# Patient Record
Sex: MALE | Race: WHITE | NOT HISPANIC OR LATINO | Employment: OTHER | URBAN - METROPOLITAN AREA
[De-identification: names, ages, dates, MRNs, and addresses within clinical notes are randomized per-mention and may not be internally consistent; named-entity substitution may affect disease eponyms.]

---

## 2023-02-14 PROBLEM — K86.9 PANCREATIC LESION (HHS-HCC): Status: ACTIVE | Noted: 2023-02-14

## 2023-02-14 PROBLEM — E78.1 HYPERTRIGLYCERIDEMIA: Status: ACTIVE | Noted: 2023-02-14

## 2023-02-14 PROBLEM — L84 FOOT CALLUS: Status: ACTIVE | Noted: 2023-02-14

## 2023-02-14 PROBLEM — N28.9 KIDNEY LESION, NATIVE, RIGHT: Status: ACTIVE | Noted: 2023-02-14

## 2023-02-14 PROBLEM — R31.9 BLOOD IN URINE: Status: ACTIVE | Noted: 2023-02-14

## 2023-02-14 PROBLEM — G47.33 OSA (OBSTRUCTIVE SLEEP APNEA): Status: ACTIVE | Noted: 2023-02-14

## 2023-02-14 PROBLEM — N52.9 MALE ERECTILE DISORDER: Status: ACTIVE | Noted: 2023-02-14

## 2023-02-14 PROBLEM — N40.0 BENIGN ENLARGEMENT OF PROSTATE: Status: ACTIVE | Noted: 2023-02-14

## 2023-02-14 PROBLEM — N28.89 RIGHT KIDNEY MASS: Status: ACTIVE | Noted: 2023-02-14

## 2023-02-14 PROBLEM — R42 VERTIGO: Status: ACTIVE | Noted: 2023-02-14

## 2023-02-14 PROBLEM — I10 HYPERTENSION: Status: ACTIVE | Noted: 2023-02-14

## 2023-02-14 PROBLEM — M25.469 KNEE SWELLING: Status: ACTIVE | Noted: 2023-02-14

## 2023-02-14 PROBLEM — R25.1 TREMOR: Status: ACTIVE | Noted: 2023-02-14
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
## 2023-02-14 PROBLEM — H81.10 BPPV (BENIGN PAROXYSMAL POSITIONAL VERTIGO): Status: ACTIVE | Noted: 2023-02-14

## 2023-02-14 PROBLEM — E78.5 HYPERLIPIDEMIA: Status: ACTIVE | Noted: 2023-02-14

## 2023-02-14 PROBLEM — R79.89 ELEVATED TSH: Status: ACTIVE | Noted: 2023-02-14

## 2023-02-14 PROBLEM — L03.115 CELLULITIS OF RIGHT LOWER EXTREMITY: Status: ACTIVE | Noted: 2023-02-14

## 2023-02-14 PROBLEM — G25.0 BENIGN ESSENTIAL TREMOR: Status: ACTIVE | Noted: 2023-02-14

## 2023-02-14 PROBLEM — E11.9 DIABETES MELLITUS (MULTI): Status: ACTIVE | Noted: 2023-02-14

## 2023-02-14 PROBLEM — R26.89 BALANCE PROBLEMS: Status: ACTIVE | Noted: 2023-02-14

## 2023-02-14 PROBLEM — S89.90XA INJURY OF LEG: Status: ACTIVE | Noted: 2023-02-14

## 2023-02-14 PROBLEM — R80.9 PROTEIN IN URINE: Status: ACTIVE | Noted: 2023-02-14

## 2023-02-14 PROBLEM — M79.89 RIGHT LEG SWELLING: Status: ACTIVE | Noted: 2023-02-14

## 2023-02-14 PROBLEM — R26.81 GAIT INSTABILITY: Status: ACTIVE | Noted: 2023-02-14

## 2023-02-14 PROBLEM — M19.90 ARTHRITIS: Status: ACTIVE | Noted: 2023-02-14

## 2023-02-14 PROBLEM — S39.012A LUMBAR STRAIN: Status: ACTIVE | Noted: 2023-02-14

## 2023-02-14 PROBLEM — R27.0 ATAXIA: Status: ACTIVE | Noted: 2023-02-14

## 2023-02-14 RX ORDER — METFORMIN HYDROCHLORIDE 500 MG/1
2 TABLET ORAL 2 TIMES DAILY
COMMUNITY
Start: 2014-01-23 | End: 2024-05-13

## 2023-02-14 RX ORDER — LOSARTAN POTASSIUM 100 MG/1
1 TABLET ORAL DAILY
COMMUNITY
Start: 2019-10-03 | End: 2024-03-04

## 2023-02-14 RX ORDER — HYDROCHLOROTHIAZIDE 25 MG/1
1 TABLET ORAL DAILY
COMMUNITY
Start: 2019-10-03 | End: 2024-02-16

## 2023-02-14 RX ORDER — BLOOD SUGAR DIAGNOSTIC
STRIP MISCELLANEOUS
COMMUNITY
Start: 2020-03-31 | End: 2023-05-01

## 2023-02-14 RX ORDER — SIMVASTATIN 20 MG/1
1 TABLET, FILM COATED ORAL DAILY
COMMUNITY
Start: 2014-01-23 | End: 2023-10-11 | Stop reason: SDUPTHER

## 2023-02-14 RX ORDER — ASPIRIN 81 MG/1
1 TABLET ORAL DAILY
COMMUNITY
Start: 2014-03-11

## 2023-02-14 RX ORDER — MECLIZINE HYDROCHLORIDE 25 MG/1
1 TABLET ORAL 3 TIMES DAILY PRN
COMMUNITY
Start: 2022-09-14

## 2023-02-14 RX ORDER — PRIMIDONE 50 MG/1
50 TABLET ORAL 3 TIMES DAILY
COMMUNITY
Start: 2021-07-19

## 2023-02-21 LAB
ALANINE AMINOTRANSFERASE (SGPT) (U/L) IN SER/PLAS: 17 U/L (ref 10–52)
ALBUMIN (G/DL) IN SER/PLAS: 4.3 G/DL (ref 3.4–5)
ALKALINE PHOSPHATASE (U/L) IN SER/PLAS: 48 U/L (ref 33–136)
ANION GAP IN SER/PLAS: 11 MMOL/L (ref 10–20)
ASPARTATE AMINOTRANSFERASE (SGOT) (U/L) IN SER/PLAS: 18 U/L (ref 9–39)
BASOPHILS (10*3/UL) IN BLOOD BY AUTOMATED COUNT: 0.04 X10E9/L (ref 0–0.1)
BASOPHILS/100 LEUKOCYTES IN BLOOD BY AUTOMATED COUNT: 0.6 % (ref 0–2)
BILIRUBIN TOTAL (MG/DL) IN SER/PLAS: 0.4 MG/DL (ref 0–1.2)
CALCIUM (MG/DL) IN SER/PLAS: 9.7 MG/DL (ref 8.6–10.6)
CARBON DIOXIDE, TOTAL (MMOL/L) IN SER/PLAS: 32 MMOL/L (ref 21–32)
CHLORIDE (MMOL/L) IN SER/PLAS: 101 MMOL/L (ref 98–107)
CHOLESTEROL (MG/DL) IN SER/PLAS: 150 MG/DL (ref 0–199)
CHOLESTEROL IN HDL (MG/DL) IN SER/PLAS: 42.3 MG/DL
CHOLESTEROL/HDL RATIO: 3.5
CREATININE (MG/DL) IN SER/PLAS: 1.18 MG/DL (ref 0.5–1.3)
EOSINOPHILS (10*3/UL) IN BLOOD BY AUTOMATED COUNT: 0.29 X10E9/L (ref 0–0.4)
EOSINOPHILS/100 LEUKOCYTES IN BLOOD BY AUTOMATED COUNT: 4.1 % (ref 0–6)
ERYTHROCYTE DISTRIBUTION WIDTH (RATIO) BY AUTOMATED COUNT: 14.3 % (ref 11.5–14.5)
ERYTHROCYTE MEAN CORPUSCULAR HEMOGLOBIN CONCENTRATION (G/DL) BY AUTOMATED: 32.5 G/DL (ref 32–36)
ERYTHROCYTE MEAN CORPUSCULAR VOLUME (FL) BY AUTOMATED COUNT: 94 FL (ref 80–100)
ERYTHROCYTES (10*6/UL) IN BLOOD BY AUTOMATED COUNT: 4.93 X10E12/L (ref 4.5–5.9)
ESTIMATED AVERAGE GLUCOSE FOR HBA1C: 148 MG/DL
GFR MALE: 63 ML/MIN/1.73M2
GLUCOSE (MG/DL) IN SER/PLAS: 133 MG/DL (ref 74–99)
HEMATOCRIT (%) IN BLOOD BY AUTOMATED COUNT: 46.4 % (ref 41–52)
HEMOGLOBIN (G/DL) IN BLOOD: 15.1 G/DL (ref 13.5–17.5)
HEMOGLOBIN A1C/HEMOGLOBIN TOTAL IN BLOOD: 6.8 %
IMMATURE GRANULOCYTES/100 LEUKOCYTES IN BLOOD BY AUTOMATED COUNT: 0.1 % (ref 0–0.9)
LDL: 76 MG/DL (ref 0–99)
LEUKOCYTES (10*3/UL) IN BLOOD BY AUTOMATED COUNT: 7.2 X10E9/L (ref 4.4–11.3)
LYMPHOCYTES (10*3/UL) IN BLOOD BY AUTOMATED COUNT: 3.26 X10E9/L (ref 0.8–3)
LYMPHOCYTES/100 LEUKOCYTES IN BLOOD BY AUTOMATED COUNT: 45.6 % (ref 13–44)
MONOCYTES (10*3/UL) IN BLOOD BY AUTOMATED COUNT: 0.53 X10E9/L (ref 0.05–0.8)
MONOCYTES/100 LEUKOCYTES IN BLOOD BY AUTOMATED COUNT: 7.4 % (ref 2–10)
NEUTROPHILS (10*3/UL) IN BLOOD BY AUTOMATED COUNT: 3.02 X10E9/L (ref 1.6–5.5)
NEUTROPHILS/100 LEUKOCYTES IN BLOOD BY AUTOMATED COUNT: 42.2 % (ref 40–80)
NRBC (PER 100 WBCS) BY AUTOMATED COUNT: 0 /100 WBC (ref 0–0)
PLATELETS (10*3/UL) IN BLOOD AUTOMATED COUNT: 220 X10E9/L (ref 150–450)
POTASSIUM (MMOL/L) IN SER/PLAS: 4.3 MMOL/L (ref 3.5–5.3)
PROSTATE SPECIFIC ANTIGEN,SCREEN: 2.14 NG/ML (ref 0–4)
PROTEIN TOTAL: 6.6 G/DL (ref 6.4–8.2)
SODIUM (MMOL/L) IN SER/PLAS: 140 MMOL/L (ref 136–145)
THYROTROPIN (MIU/L) IN SER/PLAS BY DETECTION LIMIT <= 0.05 MIU/L: 4.25 MIU/L (ref 0.44–3.98)
THYROXINE (T4) FREE (NG/DL) IN SER/PLAS: 0.86 NG/DL (ref 0.78–1.48)
TRIGLYCERIDE (MG/DL) IN SER/PLAS: 159 MG/DL (ref 0–149)
UREA NITROGEN (MG/DL) IN SER/PLAS: 17 MG/DL (ref 6–23)
VLDL: 32 MG/DL (ref 0–40)

## 2023-03-31 ENCOUNTER — OFFICE VISIT (OUTPATIENT)
Dept: PRIMARY CARE | Facility: CLINIC | Age: 78
End: 2023-03-31
Payer: MEDICARE

## 2023-03-31 VITALS
HEART RATE: 69 BPM | BODY MASS INDEX: 35.42 KG/M2 | DIASTOLIC BLOOD PRESSURE: 78 MMHG | SYSTOLIC BLOOD PRESSURE: 120 MMHG | TEMPERATURE: 97.2 F | HEIGHT: 71 IN | WEIGHT: 253 LBS

## 2023-03-31 DIAGNOSIS — Z01.818 PREOPERATIVE CLEARANCE: Primary | ICD-10-CM

## 2023-03-31 DIAGNOSIS — R06.02 SHORTNESS OF BREATH: ICD-10-CM

## 2023-03-31 DIAGNOSIS — M17.12 OSTEOARTHRITIS OF LEFT KNEE, UNSPECIFIED OSTEOARTHRITIS TYPE: ICD-10-CM

## 2023-03-31 PROBLEM — M25.469 KNEE SWELLING: Status: RESOLVED | Noted: 2023-02-14 | Resolved: 2023-03-31

## 2023-03-31 PROBLEM — R25.1 TREMOR: Status: RESOLVED | Noted: 2023-02-14 | Resolved: 2023-03-31

## 2023-03-31 PROBLEM — S89.90XA INJURY OF LEG: Status: RESOLVED | Noted: 2023-02-14 | Resolved: 2023-03-31

## 2023-03-31 PROBLEM — S39.012A LUMBAR STRAIN: Status: RESOLVED | Noted: 2023-02-14 | Resolved: 2023-03-31

## 2023-03-31 PROBLEM — L84 FOOT CALLUS: Status: RESOLVED | Noted: 2023-02-14 | Resolved: 2023-03-31

## 2023-03-31 PROBLEM — M79.89 RIGHT LEG SWELLING: Status: RESOLVED | Noted: 2023-02-14 | Resolved: 2023-03-31

## 2023-03-31 PROCEDURE — 3078F DIAST BP <80 MM HG: CPT | Performed by: NURSE PRACTITIONER

## 2023-03-31 PROCEDURE — 1159F MED LIST DOCD IN RCRD: CPT | Performed by: NURSE PRACTITIONER

## 2023-03-31 PROCEDURE — 99213 OFFICE O/P EST LOW 20 MIN: CPT | Performed by: NURSE PRACTITIONER

## 2023-03-31 PROCEDURE — 3074F SYST BP LT 130 MM HG: CPT | Performed by: NURSE PRACTITIONER

## 2023-03-31 PROCEDURE — 1036F TOBACCO NON-USER: CPT | Performed by: NURSE PRACTITIONER

## 2023-03-31 PROCEDURE — 1160F RVW MEDS BY RX/DR IN RCRD: CPT | Performed by: NURSE PRACTITIONER

## 2023-03-31 ASSESSMENT — ENCOUNTER SYMPTOMS
DIARRHEA: 0
ABDOMINAL PAIN: 0
COUGH: 0
SHORTNESS OF BREATH: 0
FEVER: 0
CHILLS: 0
VOMITING: 0
NAUSEA: 0
FATIGUE: 0

## 2023-03-31 NOTE — PROGRESS NOTES
"Subjective   Chief Complaint: Pre-op Exam.    [facility]   Ed ALIYAH Sampson is a 77 y.o. male who presents for Pre-op Exam.  Patient presents for surgical clearance for upcoming surgery on April 18, 2023 for left knee arthroplasty with Dr. Reymundo Mark.   Patient reports feeling well today.  He denies any new or concerning symptoms.  Patient is currently on Eliquis for history of DVT in right leg years ago.  He denies any history of pulmonary embolism.    He follows with Dr. Pink for tremors and is currently on primidone  He denies fevers, chills, nausea, vomiting, diarrhea, heart palpation, chest pain, shortness of breath.    Patient had right knee surgery 6 years ago  Patient denies personal or family history of complications with anesthesia.    Review of Systems   Constitutional:  Negative for chills, fatigue and fever.   HENT:  Negative for congestion.    Respiratory:  Negative for cough and shortness of breath.    Cardiovascular:  Negative for chest pain.   Gastrointestinal:  Negative for abdominal pain, diarrhea, nausea and vomiting.       Objective   /78   Pulse 69   Temp 36.2 °C (97.2 °F) (Temporal)   Ht 1.803 m (5' 11\")   Wt 115 kg (253 lb)   BMI 35.29 kg/m²   BSA Body surface area is 2.4 meters squared.      Physical Exam  Constitutional:       Appearance: Normal appearance.   HENT:      Right Ear: Tympanic membrane normal.      Left Ear: Tympanic membrane normal.      Nose: Nose normal.      Mouth/Throat:      Mouth: Mucous membranes are dry.   Eyes:      Extraocular Movements: Extraocular movements intact.      Pupils: Pupils are equal, round, and reactive to light.   Cardiovascular:      Rate and Rhythm: Normal rate and regular rhythm.      Pulses: Normal pulses.      Heart sounds: Normal heart sounds.   Pulmonary:      Effort: Pulmonary effort is normal.      Breath sounds: Normal breath sounds.   Abdominal:      General: Abdomen is flat. Bowel sounds are normal. There is no distension.      " Palpations: Abdomen is soft.   Musculoskeletal:         General: Normal range of motion.   Neurological:      General: No focal deficit present.      Mental Status: He is alert and oriented to person, place, and time.       Orders Only on 02/21/2023   Component Date Value Ref Range Status    Free T4 02/21/2023 0.86  0.78 - 1.48 ng/dL Final    Comment:  Thyroxine Free testing is performed using different testing    methodology at [facility] than at other    [facility]. Direct result comparisons should    only be made within the same method.     Orders Only on 02/21/2023   Component Date Value Ref Range Status    Cholesterol 02/21/2023 150  0 - 199 mg/dL Final    Comment: .      AGE      DESIRABLE   BORDERLINE HIGH   HIGH     0-19 Y     0 - 169       170 - 199     >/= 200    20-24 Y     0 - 189       190 - 224     >/= 225         >24 Y     0 - 199       200 - 239     >/= 240   **All ranges are based on fasting samples. Specific   therapeutic targets will vary based on patient-specific   cardiac risk.  .   Pediatric guidelines reference:Pediatrics 2011, 128(S5).   Adult guidelines reference: NCEP ATPIII Guidelines,     KEESHA 2001, 258:2486-97  .   Venipuncture immediately after or during the    administration of Metamizole may lead to falsely   low results. Testing should be performed immediately   prior to Metamizole dosing.      HDL 02/21/2023 42.3  mg/dL Final    Comment: .      AGE      VERY LOW   LOW     NORMAL    HIGH       0-19 Y       < 35   < 40     40-45     ----    20-24 Y       ----   < 40       >45     ----      >24 Y       ----   < 40     40-60      >60  .      Cholesterol/HDL Ratio 02/21/2023 3.5   Final    Comment: REF VALUES  DESIRABLE  < 3.4  HIGH RISK  > 5.0      LDL 02/21/2023 76  0 - 99 mg/dL Final    Comment: .                           NEAR      BORD      AGE      DESIRABLE  OPTIMAL    HIGH     HIGH     VERY HIGH     0-19 Y     0 - 109     ---    110-129   >/= 130     ----     20-24 Y     0 - 119     ---    120-159   >/= 160     ----      >24 Y     0 -  99   100-129  130-159   160-189     >/=190  .      VLDL 02/21/2023 32  0 - 40 mg/dL Final    Triglycerides 02/21/2023 159 (H)  0 - 149 mg/dL Final    Comment: .      AGE      DESIRABLE   BORDERLINE HIGH   HIGH     VERY HIGH   0 D-90 D    19 - 174         ----         ----        ----  91 D- 9 Y     0 -  74        75 -  99     >/= 100      ----    10-19 Y     0 -  89        90 - 129     >/= 130      ----    20-24 Y     0 - 114       115 - 149     >/= 150      ----         >24 Y     0 - 149       150 - 199    200- 499    >/= 500  .   Venipuncture immediately after or during the    administration of Metamizole may lead to falsely   low results. Testing should be performed immediately   prior to Metamizole dosing.     Orders Only on 02/21/2023   Component Date Value Ref Range Status    WBC 02/21/2023 7.2  4.4 - 11.3 x10E9/L Final    nRBC 02/21/2023 0.0  0.0 - 0.0 /100 WBC Final    RBC 02/21/2023 4.93  4.50 - 5.90 x10E12/L Final    Hemoglobin 02/21/2023 15.1  13.5 - 17.5 g/dL Final    Hematocrit 02/21/2023 46.4  41.0 - 52.0 % Final    MCV 02/21/2023 94  80 - 100 fL Final    MCHC 02/21/2023 32.5  32.0 - 36.0 g/dL Final    Platelets 02/21/2023 220  150 - 450 x10E9/L Final    RDW 02/21/2023 14.3  11.5 - 14.5 % Final    Neutrophils % 02/21/2023 42.2  40.0 - 80.0 % Final    Immature Granulocytes %, Automated 02/21/2023 0.1  0.0 - 0.9 % Final    Comment:  Immature Granulocyte Count (IG) includes promyelocytes,    myelocytes and metamyelocytes but does not include bands.   Percent differential counts (%) should be interpreted in the   context of the absolute cell counts (cells/L).      Lymphocytes % 02/21/2023 45.6  13.0 - 44.0 % Final    Monocytes % 02/21/2023 7.4  2.0 - 10.0 % Final    Eosinophils % 02/21/2023 4.1  0.0 - 6.0 % Final    Basophils % 02/21/2023 0.6  0.0 - 2.0 % Final    Neutrophils Absolute 02/21/2023 3.02  1.60 - 5.50 x10E9/L Final     Lymphocytes Absolute 02/21/2023 3.26 (H)  0.80 - 3.00 x10E9/L Final    Monocytes Absolute 02/21/2023 0.53  0.05 - 0.80 x10E9/L Final    Eosinophils Absolute 02/21/2023 0.29  0.00 - 0.40 x10E9/L Final    Basophils Absolute 02/21/2023 0.04  0.00 - 0.10 x10E9/L Final   Orders Only on 02/21/2023   Component Date Value Ref Range Status    TSH 02/21/2023 4.25 (H)  0.44 - 3.98 mIU/L Final    Comment:  TSH testing is performed using different testing    methodology at [facility] than at other    [facility] hospitals. Direct result comparisons should    only be made within the same method.     Orders Only on 02/21/2023   Component Date Value Ref Range Status    Glucose 02/21/2023 133 (H)  74 - 99 mg/dL Final    Sodium 02/21/2023 140  136 - 145 mmol/L Final    Potassium 02/21/2023 4.3  3.5 - 5.3 mmol/L Final    Chloride 02/21/2023 101  98 - 107 mmol/L Final    Bicarbonate 02/21/2023 32  21 - 32 mmol/L Final    Anion Gap 02/21/2023 11  10 - 20 mmol/L Final    Urea Nitrogen 02/21/2023 17  6 - 23 mg/dL Final    Creatinine 02/21/2023 1.18  0.50 - 1.30 mg/dL Final    GFR MALE 02/21/2023 63  >90 mL/min/1.73m2 Final    Comment:  CALCULATIONS OF ESTIMATED GFR ARE PERFORMED   USING THE 2021 CKD-EPI STUDY REFIT EQUATION   WITHOUT THE RACE VARIABLE FOR THE IDMS-TRACEABLE   CREATININE METHODS.    https://jasn.asnjournals.org/content/early/2021/09/22/ASN.0301650546      Calcium 02/21/2023 9.7  8.6 - 10.6 mg/dL Final    Albumin 02/21/2023 4.3  3.4 - 5.0 g/dL Final    Alkaline Phosphatase 02/21/2023 48  33 - 136 U/L Final    Total Protein 02/21/2023 6.6  6.4 - 8.2 g/dL Final    AST 02/21/2023 18  9 - 39 U/L Final    Total Bilirubin 02/21/2023 0.4  0.0 - 1.2 mg/dL Final    ALT (SGPT) 02/21/2023 17  10 - 52 U/L Final    Comment:  Patients treated with Sulfasalazine may generate    falsely decreased results for ALT.     Orders Only on 02/21/2023   Component Date Value Ref Range Status    Prostate Specific Antigen,Screen 02/21/2023  2.14  0.00 - 4.00 ng/mL Final    Comment: The FDA requires that the method used for PSA assay be   reported to the physician. Values obtained with different   assay methods must not be used interchangeably. This test   was performed at [facility] using the Siemens  InterviewBest PSA method, which is a sandwich immunoassay using   chemiluminescence for quantitation. The assay is approved  for measurement of prostate-specific antigen (PSA) in   serum and may be used in conjunction with a digital rectal  examination in men 50 years and older as an aid in   detection of prostate cancer.   5-Alpha-reductase inhibitors (e.g. Proscar, Finasteride,   Avodart, Dutasteride and Adalgisa) for the treatment of BPH   have been shown to lower PSA levels by an average of 50%   after 6 months of treatment.     Orders Only on 02/21/2023   Component Date Value Ref Range Status    Hemoglobin A1C 02/21/2023 6.8 (A)  % Final    Comment:      Diagnosis of Diabetes-Adults   Non-Diabetic: < or = 5.6%   Increased risk for developing diabetes: 5.7-6.4%   Diagnostic of diabetes: > or = 6.5%  .       Monitoring of Diabetes                Age (y)     Therapeutic Goal (%)   Adults:          >18           <7.0   Pediatrics:    13-18           <7.5                   7-12           <8.0                   0- 6            7.5-8.5   American Diabetes Association. Diabetes Care 33(S1), Jan 2010.      Estimated Average Glucose 02/21/2023 148  MG/DL Final   [facility] Encounter on 06/21/2022   Component Date Value Ref Range Status    T3, Free 06/21/2022 3.3  2.3 - 4.2 pg/mL Final    TSH 06/21/2022 2.85  0.44 - 3.98 mIU/L Final    Comment:  TSH testing is performed using different testing    methodology at [facility]] than at other    [facility] hospitals. Direct result comparisons should    only be made within the same method.      T4, Total 06/21/2022 5.8  4.5 - 11.1 ug/dL Final   [facility] Encounter on 04/19/2022   Component Date Value Ref  Range Status    Color, Urine 04/19/2022 YELLOW  STRAW,YELLOW Final    Appearance, Urine 04/19/2022 CLEAR  CLEAR Final    Specific Gravity, Urine 04/19/2022 1.018  1.005 - 1.035 Final    pH, Urine 04/19/2022 5.0  5.0 - 8.0 Final    Protein, Urine 04/19/2022 NEGATIVE  NEGATIVE mg/dL Final    Glucose, Urine 04/19/2022 NEGATIVE  NEGATIVE mg/dL Final    Blood, Urine 04/19/2022 NEGATIVE  NEGATIVE Final    Ketones, Urine 04/19/2022 NEGATIVE  NEGATIVE mg/dL Final    Bilirubin, Urine 04/19/2022 NEGATIVE  NEGATIVE Final    Urobilinogen, Urine 04/19/2022 <2.0  0.0 - 1.9 mg/dL Final    Nitrite, Urine 04/19/2022 NEGATIVE  NEGATIVE Final    Leukocyte Esterase, Urine 04/19/2022 NEGATIVE  NEGATIVE Final   [facility] Encounter on 04/16/2022   Component Date Value Ref Range Status    Hemoglobin A1C 04/16/2022 6.7 (A)  % Final    Comment:      Diagnosis of Diabetes-Adults   Non-Diabetic: < or = 5.6%   Increased risk for developing diabetes: 5.7-6.4%   Diagnostic of diabetes: > or = 6.5%  .       Monitoring of Diabetes                Age (y)     Therapeutic Goal (%)   Adults:          >18           <7.0   Pediatrics:    13-18           <7.5                   7-12           <8.0                   0- 6            7.5-8.5   American Diabetes Association. Diabetes Care 33(S1), Jan 2010.      Estimated Average Glucose 04/16/2022 146  MG/DL Final   There may be more visits with results that are not included.     Current Outpatient Medications on File Prior to Visit   Medication Sig Dispense Refill    apixaban (Eliquis) 5 mg tablet Take 1 tablet (5 mg) by mouth in the morning and 1 tablet (5 mg) before bedtime.      aspirin 81 mg EC tablet Take 1 tablet (81 mg) by mouth once daily.      hydroCHLOROthiazide (HYDRODiuril) 25 mg tablet Take 1 tablet (25 mg) by mouth once daily.      losartan (Cozaar) 100 mg tablet Take 1 tablet (100 mg) by mouth once daily.      meclizine (Antivert) 25 mg tablet Take 1 tablet (25 mg) by mouth 3 times a day as  needed.      metFORMIN (Glucophage) 500 mg tablet Take 2 tablets (1,000 mg) by mouth in the morning and 2 tablets (1,000 mg) before bedtime.      OneTouch Ultra Test strip in the morning. USE 1 STRIP Daily      primidone (Mysoline) 50 mg tablet Take 1 tablet (50 mg) by mouth in the morning and 1 tablet (50 mg) in the evening and 1 tablet (50 mg) before bedtime.      simvastatin (Zocor) 20 mg tablet Take 1 tablet (20 mg) by mouth once daily.       No current facility-administered medications on file prior to visit.     No images are attached to the encounter.            Assessment/Plan   Problem List Items Addressed This Visit          Musculoskeletal    Degenerative joint disease of left knee       Other    Preoperative clearance - Primary     Patient planned for surgery on April 18, 2023 for left knee arthroplasty with Dr. Reymundo Mark  --In office EKG shows normal sinus rhythm  --Patient to have blood work obtained  --Patient cleared for minimally invasive surgery

## 2023-03-31 NOTE — PATIENT INSTRUCTIONS
In office EKG shows normal sinus rhythm  Patient has blood work orders and presurgical skills clearance next week  Patient is cleared for minimally invasive surgery

## 2023-03-31 NOTE — ASSESSMENT & PLAN NOTE
Patient planned for surgery on April 18, 2023 for left knee arthroplasty with Dr. Reymundo Mark  --In office EKG shows normal sinus rhythm  --Patient to have blood work obtained  --Patient cleared for minimally invasive surgery

## 2023-05-01 DIAGNOSIS — E11.9 TYPE 2 DIABETES MELLITUS WITHOUT COMPLICATION, WITHOUT LONG-TERM CURRENT USE OF INSULIN (MULTI): Primary | ICD-10-CM

## 2023-05-01 RX ORDER — BLOOD SUGAR DIAGNOSTIC
STRIP MISCELLANEOUS
Qty: 100 STRIP | Refills: 0 | Status: SHIPPED | OUTPATIENT
Start: 2023-05-01 | End: 2024-04-16

## 2023-07-07 ENCOUNTER — OFFICE VISIT (OUTPATIENT)
Dept: PRIMARY CARE | Facility: CLINIC | Age: 78
End: 2023-07-07
Payer: MEDICARE

## 2023-07-07 ENCOUNTER — LAB (OUTPATIENT)
Dept: LAB | Facility: LAB | Age: 78
End: 2023-07-07
Payer: MEDICARE

## 2023-07-07 VITALS
WEIGHT: 247 LBS | RESPIRATION RATE: 16 BRPM | DIASTOLIC BLOOD PRESSURE: 64 MMHG | BODY MASS INDEX: 34.58 KG/M2 | TEMPERATURE: 97.3 F | HEART RATE: 88 BPM | HEIGHT: 71 IN | SYSTOLIC BLOOD PRESSURE: 120 MMHG | OXYGEN SATURATION: 99 %

## 2023-07-07 DIAGNOSIS — Z13.89 ENCOUNTER FOR SCREENING FOR OTHER DISORDER: ICD-10-CM

## 2023-07-07 DIAGNOSIS — Z71.89 CARDIAC RISK COUNSELING: ICD-10-CM

## 2023-07-07 DIAGNOSIS — E11.9 TYPE 2 DIABETES MELLITUS WITHOUT COMPLICATION, WITHOUT LONG-TERM CURRENT USE OF INSULIN (MULTI): ICD-10-CM

## 2023-07-07 DIAGNOSIS — R94.6 ABNORMAL THYROID FUNCTION TEST: ICD-10-CM

## 2023-07-07 DIAGNOSIS — E78.2 MIXED HYPERLIPIDEMIA: ICD-10-CM

## 2023-07-07 DIAGNOSIS — L30.9 ECZEMA, UNSPECIFIED TYPE: ICD-10-CM

## 2023-07-07 DIAGNOSIS — R79.89 ABNORMAL TSH: ICD-10-CM

## 2023-07-07 DIAGNOSIS — I10 PRIMARY HYPERTENSION: ICD-10-CM

## 2023-07-07 DIAGNOSIS — Z00.00 ROUTINE GENERAL MEDICAL EXAMINATION AT HEALTH CARE FACILITY: Primary | ICD-10-CM

## 2023-07-07 PROBLEM — Z01.818 PREOPERATIVE CLEARANCE: Status: RESOLVED | Noted: 2023-03-31 | Resolved: 2023-07-07
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
## 2023-07-07 PROBLEM — L03.115 CELLULITIS OF RIGHT LOWER EXTREMITY: Status: RESOLVED | Noted: 2023-02-14 | Resolved: 2023-07-07

## 2023-07-07 PROCEDURE — 3078F DIAST BP <80 MM HG: CPT | Performed by: FAMILY MEDICINE

## 2023-07-07 PROCEDURE — G0444 DEPRESSION SCREEN ANNUAL: HCPCS | Performed by: FAMILY MEDICINE

## 2023-07-07 PROCEDURE — 84443 ASSAY THYROID STIM HORMONE: CPT

## 2023-07-07 PROCEDURE — G0439 PPPS, SUBSEQ VISIT: HCPCS | Performed by: FAMILY MEDICINE

## 2023-07-07 PROCEDURE — 1159F MED LIST DOCD IN RCRD: CPT | Performed by: FAMILY MEDICINE

## 2023-07-07 PROCEDURE — 3074F SYST BP LT 130 MM HG: CPT | Performed by: FAMILY MEDICINE

## 2023-07-07 PROCEDURE — 1036F TOBACCO NON-USER: CPT | Performed by: FAMILY MEDICINE

## 2023-07-07 PROCEDURE — 1170F FXNL STATUS ASSESSED: CPT | Performed by: FAMILY MEDICINE

## 2023-07-07 PROCEDURE — 1160F RVW MEDS BY RX/DR IN RCRD: CPT | Performed by: FAMILY MEDICINE

## 2023-07-07 PROCEDURE — 99214 OFFICE O/P EST MOD 30 MIN: CPT | Performed by: FAMILY MEDICINE

## 2023-07-07 PROCEDURE — G0446 INTENS BEHAVE THER CARDIO DX: HCPCS | Performed by: FAMILY MEDICINE

## 2023-07-07 PROCEDURE — 36415 COLL VENOUS BLD VENIPUNCTURE: CPT

## 2023-07-07 ASSESSMENT — ENCOUNTER SYMPTOMS
CONSTIPATION: 0
CHILLS: 0
LOSS OF SENSATION IN FEET: 0
APPETITE CHANGE: 0
COUGH: 0
ARTHRALGIAS: 0
ACTIVITY CHANGE: 0
DIARRHEA: 0
FACIAL SWELLING: 0
DEPRESSION: 0
WHEEZING: 0
EYE DISCHARGE: 0
PALPITATIONS: 0
OCCASIONAL FEELINGS OF UNSTEADINESS: 0
CONFUSION: 0
COLOR CHANGE: 0
FEVER: 0

## 2023-07-07 ASSESSMENT — PATIENT HEALTH QUESTIONNAIRE - PHQ9
2. FEELING DOWN, DEPRESSED OR HOPELESS: NOT AT ALL
1. LITTLE INTEREST OR PLEASURE IN DOING THINGS: NOT AT ALL
SUM OF ALL RESPONSES TO PHQ9 QUESTIONS 1 AND 2: 0

## 2023-07-07 ASSESSMENT — ACTIVITIES OF DAILY LIVING (ADL)
TAKING_MEDICATION: INDEPENDENT
GROCERY_SHOPPING: INDEPENDENT
DOING_HOUSEWORK: INDEPENDENT
BATHING: INDEPENDENT
MANAGING_FINANCES: INDEPENDENT
DRESSING: INDEPENDENT

## 2023-07-07 NOTE — PROGRESS NOTES
"Subjective   Reason for Visit: Av Sampson is an 78 y.o. male here for a Medicare Wellness visit.     Past Medical, Surgical, and Family History reviewed and updated in chart.    Reviewed all medications by prescribing practitioner or clinical pharmacist (such as prescriptions, OTCs, herbal therapies and supplements) and documented in the medical record.    HPI  Patient presents for physical exam.      Fam Hx  Mom (96) , HTN  Dad (88) , HTN     Exercise 1/2 hour walking  ETOH denies  Caffeine 1 soda/day  Tobacco cigars 2 years     Dr. Guerrero, ophthalmology April/May      Colonoscopy Dr. Gonsales 2016 \"does not need again per Dr. Gonsales\"     Patient denies any other complaints.   Patient Self Assessment of Health Status  Patient Self Assessment: Excellent    Nutrition and Exercise  Current Diet: Well Balanced Diet  Adequate Fluid Intake: Yes  Caffeine: Yes  Exercise Frequency: Regularly    Functional Ability/Level of Safety  Cognitive Impairment Observed: No cognitive impairment observed  Cognitive Impairment Reported: No cognitive impairment reported by patient or family    Home Safety Risk Factors: None    Patient Care Team:  Suzie Green DO as PCP - General  Suzie Green DO as PCP - [facility] Attributed Provider     Review of Systems   Constitutional:  Negative for activity change, appetite change, chills and fever.   HENT:  Negative for congestion, ear pain and facial swelling.    Eyes:  Negative for discharge.   Respiratory:  Negative for cough and wheezing.    Cardiovascular:  Negative for chest pain, palpitations and leg swelling.   Gastrointestinal:  Negative for constipation and diarrhea.   Musculoskeletal:  Negative for arthralgias.   Skin:  Negative for color change and pallor.   Neurological:  Negative for syncope.   Psychiatric/Behavioral:  Negative for confusion.        Objective   Vitals:  /64   Pulse 88   Temp 36.3 °C (97.3 °F)   Resp 16   Ht 1.803 m (5' 11\")   Wt 112 " kg (247 lb)   SpO2 99%   BMI 34.45 kg/m²       Physical Exam  Constitutional:       General: He is not in acute distress.     Appearance: Normal appearance. He is not toxic-appearing.   HENT:      Head: Normocephalic.      Right Ear: Tympanic membrane, ear canal and external ear normal.      Left Ear: Tympanic membrane, ear canal and external ear normal.      Nose: Nose normal.      Mouth/Throat:      Pharynx: Oropharynx is clear.   Eyes:      Conjunctiva/sclera: Conjunctivae normal.      Pupils: Pupils are equal, round, and reactive to light.   Cardiovascular:      Rate and Rhythm: Normal rate and regular rhythm.      Pulses: Normal pulses.      Heart sounds: Normal heart sounds.   Pulmonary:      Effort: No respiratory distress.      Breath sounds: No wheezing, rhonchi or rales.   Abdominal:      General: Bowel sounds are normal. There is no distension.      Palpations: Abdomen is soft.      Tenderness: There is no abdominal tenderness.   Musculoskeletal:         General: No swelling or tenderness.      Cervical back: No tenderness.   Skin:     Findings: No lesion or rash.   Neurological:      General: No focal deficit present.      Mental Status: He is alert and oriented to person, place, and time. Mental status is at baseline.      Gait: Gait normal.   Psychiatric:         Mood and Affect: Mood normal.         Behavior: Behavior normal.         Thought Content: Thought content normal.         Judgment: Judgment normal.         Assessment/Plan   Problem List Items Addressed This Visit       Hyperlipidemia    Hypertension    Diabetes mellitus (CMS/HCC)     Other Visit Diagnoses       Routine general medical examination at health care facility    -  Primary    Relevant Orders    1 Year Follow Up In [facility] - PCP - Wellness Exam    Abnormal TSH        Encounter for screening for other disorder [Z13.89]        Cardiac risk counseling [Z71.89]            1. Patient's blood work discussed at this office  "visit  2. Patient's current LDL 66, goal LDL <70  3. , goal TG <150, continue TYPE IV diet and exercise  4. HgbA1c is 6.8, goal <6.5, continue no added sugar diet  5. Creatinine is slightly elevated, this is about the same as last year  6. Liver function is back to normal  7. Patient sees Dr. Guerrero, ophthalmology April/May yearly  8. Colonoscopy Dr. Gonsales 2016 \"does not need again per Dr. Gonsales\"  9. Patient to call if questions or concerns          "

## 2023-07-08 LAB — THYROTROPIN (MIU/L) IN SER/PLAS BY DETECTION LIMIT <= 0.05 MIU/L: 2.8 MIU/L (ref 0.44–3.98)

## 2023-07-10 DIAGNOSIS — L30.9 ECZEMA, UNSPECIFIED TYPE: ICD-10-CM

## 2023-07-10 RX ORDER — MOMETASONE FUROATE 1 MG/G
OINTMENT TOPICAL
Qty: 15 G | Refills: 0 | Status: SHIPPED | OUTPATIENT
Start: 2023-07-10

## 2023-07-11 RX ORDER — MOMETASONE FUROATE 1 MG/G
OINTMENT TOPICAL
Qty: 15 G | Refills: 1 | Status: SHIPPED
Start: 2023-07-11 | End: 2024-02-21 | Stop reason: WASHOUT

## 2023-08-08 ENCOUNTER — APPOINTMENT (OUTPATIENT)
Dept: PRIMARY CARE | Facility: CLINIC | Age: 78
End: 2023-08-08
Payer: MEDICARE

## 2023-10-09 ENCOUNTER — ANCILLARY PROCEDURE (OUTPATIENT)
Dept: RADIOLOGY | Facility: CLINIC | Age: 78
End: 2023-10-09
Payer: MEDICARE

## 2023-10-09 ENCOUNTER — LAB (OUTPATIENT)
Dept: LAB | Facility: LAB | Age: 78
End: 2023-10-09
Payer: MEDICARE

## 2023-10-09 ENCOUNTER — OFFICE VISIT (OUTPATIENT)
Dept: PRIMARY CARE | Facility: CLINIC | Age: 78
End: 2023-10-09
Payer: MEDICARE

## 2023-10-09 VITALS
SYSTOLIC BLOOD PRESSURE: 128 MMHG | OXYGEN SATURATION: 95 % | WEIGHT: 255.4 LBS | HEART RATE: 82 BPM | HEIGHT: 72 IN | DIASTOLIC BLOOD PRESSURE: 66 MMHG | BODY MASS INDEX: 34.59 KG/M2

## 2023-10-09 DIAGNOSIS — R60.9 EDEMA, UNSPECIFIED TYPE: ICD-10-CM

## 2023-10-09 DIAGNOSIS — E78.2 MIXED HYPERLIPIDEMIA: ICD-10-CM

## 2023-10-09 DIAGNOSIS — I50.9 EDEMA DUE TO CONGESTIVE HEART FAILURE (MULTI): ICD-10-CM

## 2023-10-09 DIAGNOSIS — R60.9 EDEMA, UNSPECIFIED TYPE: Primary | ICD-10-CM

## 2023-10-09 PROBLEM — E66.9 OBESITY: Status: ACTIVE | Noted: 2023-10-09

## 2023-10-09 PROCEDURE — 36415 COLL VENOUS BLD VENIPUNCTURE: CPT

## 2023-10-09 PROCEDURE — 83880 ASSAY OF NATRIURETIC PEPTIDE: CPT

## 2023-10-09 PROCEDURE — 3074F SYST BP LT 130 MM HG: CPT | Performed by: FAMILY MEDICINE

## 2023-10-09 PROCEDURE — 71046 X-RAY EXAM CHEST 2 VIEWS: CPT | Mod: FR

## 2023-10-09 PROCEDURE — 1159F MED LIST DOCD IN RCRD: CPT | Performed by: FAMILY MEDICINE

## 2023-10-09 PROCEDURE — 1160F RVW MEDS BY RX/DR IN RCRD: CPT | Performed by: FAMILY MEDICINE

## 2023-10-09 PROCEDURE — 99214 OFFICE O/P EST MOD 30 MIN: CPT | Performed by: FAMILY MEDICINE

## 2023-10-09 PROCEDURE — 71046 X-RAY EXAM CHEST 2 VIEWS: CPT | Mod: FOREIGN READ | Performed by: RADIOLOGY

## 2023-10-09 PROCEDURE — 3078F DIAST BP <80 MM HG: CPT | Performed by: FAMILY MEDICINE

## 2023-10-09 PROCEDURE — 1036F TOBACCO NON-USER: CPT | Performed by: FAMILY MEDICINE

## 2023-10-09 RX ORDER — HYDROCHLOROTHIAZIDE 25 MG/1
TABLET ORAL
COMMUNITY
Start: 2019-10-03 | End: 2023-10-09 | Stop reason: WASHOUT

## 2023-10-09 RX ORDER — FUROSEMIDE 20 MG/1
20 TABLET ORAL DAILY
Qty: 30 TABLET | Refills: 1 | Status: SHIPPED | OUTPATIENT
Start: 2023-10-09 | End: 2024-10-08

## 2023-10-09 ASSESSMENT — ENCOUNTER SYMPTOMS
COUGH: 0
ARTHRALGIAS: 0
CONSTIPATION: 0
CHILLS: 0
COLOR CHANGE: 0
PALPITATIONS: 0
WHEEZING: 0
CONFUSION: 0
FEVER: 0
FACIAL SWELLING: 0
DIARRHEA: 0
EYE DISCHARGE: 0
ACTIVITY CHANGE: 0
APPETITE CHANGE: 0

## 2023-10-09 NOTE — PROGRESS NOTES
Subjective   Patient ID: Ed ALIYAH Sampson is a 78 y.o. male who presents for Bilateral ankle swelling with seeping. .    HPI   Patient explains that he is noting some swelling in his legs. He feels that he has gained 8 lbs since July 7th. He is not SOB, or having chest pain syncopal episodes or palpitations. He has some seasonal allergies.     Review of Systems   Constitutional:  Negative for activity change, appetite change, chills and fever.   HENT:  Negative for congestion, ear pain and facial swelling.    Eyes:  Negative for discharge.   Respiratory:  Negative for cough and wheezing.    Cardiovascular:  Positive for leg swelling. Negative for chest pain and palpitations.   Gastrointestinal:  Negative for constipation and diarrhea.   Musculoskeletal:  Negative for arthralgias.   Skin:  Negative for color change and pallor.   Neurological:  Negative for syncope.   Psychiatric/Behavioral:  Negative for confusion.        Objective   /66 (BP Location: Right arm)   Pulse 82   Ht 1.829 m (6')   Wt 116 kg (255 lb 6.4 oz)   SpO2 95%   BMI 34.64 kg/m²   BSA Body surface area is 2.43 meters squared.      Physical Exam  Constitutional:       General: He is not in acute distress.     Appearance: Normal appearance. He is not toxic-appearing.   HENT:      Head: Normocephalic.      Right Ear: Tympanic membrane, ear canal and external ear normal.      Left Ear: Tympanic membrane, ear canal and external ear normal.   Eyes:      Conjunctiva/sclera: Conjunctivae normal.      Pupils: Pupils are equal, round, and reactive to light.   Cardiovascular:      Rate and Rhythm: Normal rate and regular rhythm.      Pulses: Normal pulses.      Heart sounds: Normal heart sounds.      Comments: +2 pitting edema at shin  Pulmonary:      Effort: No respiratory distress.      Breath sounds: No wheezing, rhonchi or rales.   Musculoskeletal:         General: No swelling or tenderness.      Cervical back: No tenderness.   Skin:     Findings: No  lesion or rash.   Neurological:      General: No focal deficit present.      Mental Status: He is alert and oriented to person, place, and time. Mental status is at baseline.      Gait: Gait normal.   Psychiatric:         Mood and Affect: Mood normal.         Behavior: Behavior normal.         Thought Content: Thought content normal.         Judgment: Judgment normal.       Lab on 07/07/2023   Component Date Value Ref Range Status    TSH 07/07/2023 2.80  0.44 - 3.98 mIU/L Final     TSH testing is performed using different testing    methodology at [facility] than at other    [facility]. Direct result comparisons should    only be made within the same method.   Orders Only on 02/21/2023   Component Date Value Ref Range Status    Free T4 02/21/2023 0.86  0.78 - 1.48 ng/dL Final    Comment:  Thyroxine Free testing is performed using different testing    methodology at [facility] than at other    [facility]. Direct result comparisons should    only be made within the same method.     Orders Only on 02/21/2023   Component Date Value Ref Range Status    Cholesterol 02/21/2023 150  0 - 199 mg/dL Final    Comment: .      AGE      DESIRABLE   BORDERLINE HIGH   HIGH     0-19 Y     0 - 169       170 - 199     >/= 200    20-24 Y     0 - 189       190 - 224     >/= 225         >24 Y     0 - 199       200 - 239     >/= 240   **All ranges are based on fasting samples. Specific   therapeutic targets will vary based on patient-specific   cardiac risk.  .   Pediatric guidelines reference:Pediatrics 2011, 128(S5).   Adult guidelines reference: NCEP ATPIII Guidelines,     KEESHA 2001, 258:2486-97  .   Venipuncture immediately after or during the    administration of Metamizole may lead to falsely   low results. Testing should be performed immediately   prior to Metamizole dosing.      HDL 02/21/2023 42.3  mg/dL Final    Comment: .      AGE      VERY LOW   LOW     NORMAL    HIGH       0-19 Y       <  35   < 40     40-45     ----    20-24 Y       ----   < 40       >45     ----      >24 Y       ----   < 40     40-60      >60  .      Cholesterol/HDL Ratio 02/21/2023 3.5   Final    Comment: REF VALUES  DESIRABLE  < 3.4  HIGH RISK  > 5.0      LDL 02/21/2023 76  0 - 99 mg/dL Final    Comment: .                           NEAR      BORD      AGE      DESIRABLE  OPTIMAL    HIGH     HIGH     VERY HIGH     0-19 Y     0 - 109     ---    110-129   >/= 130     ----    20-24 Y     0 - 119     ---    120-159   >/= 160     ----      >24 Y     0 -  99   100-129  130-159   160-189     >/=190  .      VLDL 02/21/2023 32  0 - 40 mg/dL Final    Triglycerides 02/21/2023 159 (H)  0 - 149 mg/dL Final    Comment: .      AGE      DESIRABLE   BORDERLINE HIGH   HIGH     VERY HIGH   0 D-90 D    19 - 174         ----         ----        ----  91 D- 9 Y     0 -  74        75 -  99     >/= 100      ----    10-19 Y     0 -  89        90 - 129     >/= 130      ----    20-24 Y     0 - 114       115 - 149     >/= 150      ----         >24 Y     0 - 149       150 - 199    200- 499    >/= 500  .   Venipuncture immediately after or during the    administration of Metamizole may lead to falsely   low results. Testing should be performed immediately   prior to Metamizole dosing.     Orders Only on 02/21/2023   Component Date Value Ref Range Status    WBC 02/21/2023 7.2  4.4 - 11.3 x10E9/L Final    nRBC 02/21/2023 0.0  0.0 - 0.0 /100 WBC Final    RBC 02/21/2023 4.93  4.50 - 5.90 x10E12/L Final    Hemoglobin 02/21/2023 15.1  13.5 - 17.5 g/dL Final    Hematocrit 02/21/2023 46.4  41.0 - 52.0 % Final    MCV 02/21/2023 94  80 - 100 fL Final    MCHC 02/21/2023 32.5  32.0 - 36.0 g/dL Final    Platelets 02/21/2023 220  150 - 450 x10E9/L Final    RDW 02/21/2023 14.3  11.5 - 14.5 % Final    Neutrophils % 02/21/2023 42.2  40.0 - 80.0 % Final    Immature Granulocytes %, Automated 02/21/2023 0.1  0.0 - 0.9 % Final    Comment:  Immature Granulocyte Count (IG) includes  promyelocytes,    myelocytes and metamyelocytes but does not include bands.   Percent differential counts (%) should be interpreted in the   context of the absolute cell counts (cells/L).      Lymphocytes % 02/21/2023 45.6  13.0 - 44.0 % Final    Monocytes % 02/21/2023 7.4  2.0 - 10.0 % Final    Eosinophils % 02/21/2023 4.1  0.0 - 6.0 % Final    Basophils % 02/21/2023 0.6  0.0 - 2.0 % Final    Neutrophils Absolute 02/21/2023 3.02  1.60 - 5.50 x10E9/L Final    Lymphocytes Absolute 02/21/2023 3.26 (H)  0.80 - 3.00 x10E9/L Final    Monocytes Absolute 02/21/2023 0.53  0.05 - 0.80 x10E9/L Final    Eosinophils Absolute 02/21/2023 0.29  0.00 - 0.40 x10E9/L Final    Basophils Absolute 02/21/2023 0.04  0.00 - 0.10 x10E9/L Final   Orders Only on 02/21/2023   Component Date Value Ref Range Status    TSH 02/21/2023 4.25 (H)  0.44 - 3.98 mIU/L Final    Comment:  TSH testing is performed using different testing    methodology at [facility] than at other    [facility]. Direct result comparisons should    only be made within the same method.     Orders Only on 02/21/2023   Component Date Value Ref Range Status    Glucose 02/21/2023 133 (H)  74 - 99 mg/dL Final    Sodium 02/21/2023 140  136 - 145 mmol/L Final    Potassium 02/21/2023 4.3  3.5 - 5.3 mmol/L Final    Chloride 02/21/2023 101  98 - 107 mmol/L Final    Bicarbonate 02/21/2023 32  21 - 32 mmol/L Final    Anion Gap 02/21/2023 11  10 - 20 mmol/L Final    Urea Nitrogen 02/21/2023 17  6 - 23 mg/dL Final    Creatinine 02/21/2023 1.18  0.50 - 1.30 mg/dL Final    GFR MALE 02/21/2023 63  >90 mL/min/1.73m2 Final    Comment:  CALCULATIONS OF ESTIMATED GFR ARE PERFORMED   USING THE 2021 CKD-EPI STUDY REFIT EQUATION   WITHOUT THE RACE VARIABLE FOR THE IDMS-TRACEABLE   CREATININE METHODS.    https://jasn.asnjournals.org/content/early/2021/09/22/ASN.8524982682      Calcium 02/21/2023 9.7  8.6 - 10.6 mg/dL Final    Albumin 02/21/2023 4.3  3.4 - 5.0 g/dL Final     Alkaline Phosphatase 02/21/2023 48  33 - 136 U/L Final    Total Protein 02/21/2023 6.6  6.4 - 8.2 g/dL Final    AST 02/21/2023 18  9 - 39 U/L Final    Total Bilirubin 02/21/2023 0.4  0.0 - 1.2 mg/dL Final    ALT (SGPT) 02/21/2023 17  10 - 52 U/L Final    Comment:  Patients treated with Sulfasalazine may generate    falsely decreased results for ALT.     Orders Only on 02/21/2023   Component Date Value Ref Range Status    Prostate Specific Antigen,Screen 02/21/2023 2.14  0.00 - 4.00 ng/mL Final    Comment: The FDA requires that the method used for PSA assay be   reported to the physician. Values obtained with different   assay methods must not be used interchangeably. This test   was performed at [facility] using the Siemens  [employer] PSA method, which is a sandwich immunoassay using   chemiluminescence for quantitation. The assay is approved  for measurement of prostate-specific antigen (PSA) in   serum and may be used in conjunction with a digital rectal  examination in men 50 years and older as an aid in   detection of prostate cancer.   5-Alpha-reductase inhibitors (e.g. Proscar, Finasteride,   Avodart, Dutasteride and Adalgisa) for the treatment of BPH   have been shown to lower PSA levels by an average of 50%   after 6 months of treatment.     Orders Only on 02/21/2023   Component Date Value Ref Range Status    Hemoglobin A1C 02/21/2023 6.8 (A)  % Final    Comment:      Diagnosis of Diabetes-Adults   Non-Diabetic: < or = 5.6%   Increased risk for developing diabetes: 5.7-6.4%   Diagnostic of diabetes: > or = 6.5%  .       Monitoring of Diabetes                Age (y)     Therapeutic Goal (%)   Adults:          >18           <7.0   Pediatrics:    13-18           <7.5                   7-12           <8.0                   0- 6            7.5-8.5   American Diabetes Association. Diabetes Care 33(S1), Jan 2010.      Estimated Average Glucose 02/21/2023 148  MG/DL Final     Current Outpatient  Medications on File Prior to Visit   Medication Sig Dispense Refill    apixaban (Eliquis) 5 mg tablet Take 1 tablet (5 mg) by mouth 2 times a day.      aspirin 81 mg EC tablet Take 1 tablet (81 mg) by mouth once daily.      hydroCHLOROthiazide (HYDRODiuril) 25 mg tablet Take 1 tablet (25 mg) by mouth once daily.      losartan (Cozaar) 100 mg tablet Take 1 tablet (100 mg) by mouth once daily.      meclizine (Antivert) 25 mg tablet Take 1 tablet (25 mg) by mouth 3 times a day as needed.      metFORMIN (Glucophage) 500 mg tablet Take 2 tablets (1,000 mg) by mouth 2 times a day.      mometasone (Elocon) 0.1 % ointment Apply to eyelid at bedtime 15 g 1    mometasone (Elocon) 0.1 % ointment Apply to eyelid at bedtime. 15 g 0    multivitamin capsule Take 1 capsule by mouth once daily.      OneTouch Ultra Test strip TEST BLOOD SUGAR ONCE DAILY 100 strip 0    primidone (Mysoline) 50 mg tablet Take 1 tablet (50 mg) by mouth 3 times a day.      simvastatin (Zocor) 20 mg tablet Take 1 tablet (20 mg) by mouth once daily.      [DISCONTINUED] hydroCHLOROthiazide (HYDRODiuril) 25 mg tablet Take by mouth once daily.       No current facility-administered medications on file prior to visit.     No images are attached to the encounter.            Assessment/Plan   Diagnoses and all orders for this visit:  Edema, unspecified type  -     XR chest 2 views; Future  -     furosemide (Lasix) 20 mg tablet; Take 1 tablet (20 mg) by mouth once daily.  -     B-type natriuretic peptide; Future  Edema due to congestive heart failure (CMS/[facility])  -     B-type natriuretic peptide; Future  Mixed hyperlipidemia  -     Follow Up In [facility] - Pharmacy; Future

## 2023-10-10 LAB — BNP SERPL-MCNC: 21 PG/ML (ref 0–99)

## 2023-10-10 RX ORDER — AMOXICILLIN 500 MG/1
500 CAPSULE ORAL 2 TIMES DAILY
Qty: 20 CAPSULE | Refills: 0 | Status: SHIPPED | OUTPATIENT
Start: 2023-10-10 | End: 2023-10-20

## 2023-10-11 ENCOUNTER — TELEMEDICINE (OUTPATIENT)
Dept: PHARMACY | Facility: HOSPITAL | Age: 78
End: 2023-10-11
Payer: MEDICARE

## 2023-10-11 ENCOUNTER — PHARMACY VISIT (OUTPATIENT)
Dept: PHARMACY | Facility: CLINIC | Age: 78
End: 2023-10-11
Payer: COMMERCIAL

## 2023-10-11 DIAGNOSIS — E78.2 MIXED HYPERLIPIDEMIA: ICD-10-CM

## 2023-10-11 RX ORDER — SIMVASTATIN 20 MG/1
20 TABLET, FILM COATED ORAL DAILY
Qty: 90 TABLET | Refills: 1 | Status: SHIPPED | OUTPATIENT
Start: 2023-10-11 | End: 2023-10-11 | Stop reason: SDUPTHER

## 2023-10-11 RX ORDER — SIMVASTATIN 20 MG/1
20 TABLET, FILM COATED ORAL DAILY
Qty: 90 TABLET | Refills: 1 | Status: SHIPPED | OUTPATIENT
Start: 2023-10-11 | End: 2024-04-08 | Stop reason: SDUPTHER

## 2023-10-11 NOTE — PROGRESS NOTES
"Subjective   Patient ID: Diallo Sampson is a 78 y.o. male who presents for Hyperlipidemia and Atrial Fibrillation.    Referring Provider: Suzie Green DO     Mr. Sampson presents to the pharmacy team for difficulty with access to his simvastatin and Eliquis. He received notification from his mail order pharmacy that his simvastatin would be hundreds of dollars per month. He is aware of the Medicare coverage gap. He is looking for financial relief for Eliquis as well. Most recent lipid panel done in February shows cholesterol is well controlled with simvastatin 20 mg daily. He has no side effects with therapy. Additionally, Eliquis is dosed appropriately at 5 mg BID based on age, Scr, and weight. No other concerns at this time.     Objective         7/15/2022    11:43 AM 7/15/2022    11:45 AM 8/6/2022    10:32 AM 9/30/2022    11:36 AM 3/31/2023    10:05 AM 7/7/2023     8:46 AM 10/9/2023     3:42 PM   Vitals   Systolic  118 140 132 120 120 128   Diastolic  62 82 72 78 64 66   Heart Rate  77 68 72 69 88 82   Temp  36.3 °C (97.3 °F)   36.2 °C (97.2 °F) 36.3 °C (97.3 °F)    Resp      16    Height (in) 1.803 m (5' 11\")    1.803 m (5' 11\") 1.803 m (5' 11\") 1.829 m (6')   Weight (lb) 257  254 254.4 253 247 255.4   BMI 35.84 kg/m2  35.43 kg/m2 35.48 kg/m2 35.29 kg/m2 34.45 kg/m2 34.64 kg/m2   BSA (m2) 2.42 m2  2.4 m2 2.4 m2 2.4 m2 2.37 m2 2.43 m2   Visit Report     Report Report Report         Labs  Lab Results   Component Value Date    BILITOT 0.4 02/21/2023    CALCIUM 9.7 02/21/2023    CO2 32 02/21/2023     02/21/2023    CREATININE 1.18 02/21/2023    GLUCOSE 133 (H) 02/21/2023    ALKPHOS 48 02/21/2023    K 4.3 02/21/2023    PROT 6.6 02/21/2023     02/21/2023    AST 18 02/21/2023    ALT 17 02/21/2023    BUN 17 02/21/2023    ANIONGAP 11 02/21/2023    ALBUMIN 4.3 02/21/2023    GFRMALE 63 02/21/2023     Lab Results   Component Value Date    TRIG 159 (H) 02/21/2023    CHOL 150 02/21/2023    HDL 42.3 02/21/2023 "     Lab Results   Component Value Date    HGBA1C 6.8 (A) 02/21/2023       Current Outpatient Medications on File Prior to Visit   Medication Sig Dispense Refill    amoxicillin (Amoxil) 500 mg capsule Take 1 capsule (500 mg) by mouth 2 times a day for 10 days. 20 capsule 0    apixaban (Eliquis) 5 mg tablet Take 1 tablet (5 mg) by mouth 2 times a day.      aspirin 81 mg EC tablet Take 1 tablet (81 mg) by mouth once daily.      furosemide (Lasix) 20 mg tablet Take 1 tablet (20 mg) by mouth once daily. 30 tablet 1    hydroCHLOROthiazide (HYDRODiuril) 25 mg tablet Take 1 tablet (25 mg) by mouth once daily.      losartan (Cozaar) 100 mg tablet Take 1 tablet (100 mg) by mouth once daily.      meclizine (Antivert) 25 mg tablet Take 1 tablet (25 mg) by mouth 3 times a day as needed.      metFORMIN (Glucophage) 500 mg tablet Take 2 tablets (1,000 mg) by mouth 2 times a day.      mometasone (Elocon) 0.1 % ointment Apply to eyelid at bedtime 15 g 1    mometasone (Elocon) 0.1 % ointment Apply to eyelid at bedtime. 15 g 0    multivitamin capsule Take 1 capsule by mouth once daily.      OneTouch Ultra Test strip TEST BLOOD SUGAR ONCE DAILY 100 strip 0    primidone (Mysoline) 50 mg tablet Take 1 tablet (50 mg) by mouth 3 times a day.      simvastatin (Zocor) 20 mg tablet Take 1 tablet (20 mg) by mouth once daily.      [DISCONTINUED] hydroCHLOROthiazide (HYDRODiuril) 25 mg tablet Take by mouth once daily.       No current facility-administered medications on file prior to visit.        Assessment/Plan   Problem List Items Addressed This Visit             ICD-10-CM    Hyperlipidemia E78.5   Will forward simvastatin 20 mg daily to [facility] pharmacy for medication processing   Will meet with patient in-office on 10/17 for PAP form collection for enrollment  Continue all other meds at this time including Eliquis 5 mg BID  Follow up: Next week     Junior Easton, WonD

## 2023-10-16 ENCOUNTER — PHARMACY VISIT (OUTPATIENT)
Dept: PHARMACY | Facility: CLINIC | Age: 78
End: 2023-10-16
Payer: COMMERCIAL

## 2023-10-16 PROCEDURE — RXMED WILLOW AMBULATORY MEDICATION CHARGE

## 2023-11-29 ENCOUNTER — OFFICE VISIT (OUTPATIENT)
Dept: PRIMARY CARE | Facility: CLINIC | Age: 78
End: 2023-11-29
Payer: MEDICARE

## 2023-11-29 VITALS
TEMPERATURE: 97.6 F | RESPIRATION RATE: 16 BRPM | SYSTOLIC BLOOD PRESSURE: 110 MMHG | HEIGHT: 72 IN | BODY MASS INDEX: 34.67 KG/M2 | WEIGHT: 256 LBS | DIASTOLIC BLOOD PRESSURE: 67 MMHG | HEART RATE: 70 BPM | OXYGEN SATURATION: 93 %

## 2023-11-29 DIAGNOSIS — N20.0 NEPHROLITHIASIS: Primary | ICD-10-CM

## 2023-11-29 DIAGNOSIS — I72.3 ILIAC ARTERY ANEURYSM (CMS-HCC): ICD-10-CM

## 2023-11-29 DIAGNOSIS — K44.9 HIATAL HERNIA: ICD-10-CM

## 2023-11-29 PROCEDURE — 1159F MED LIST DOCD IN RCRD: CPT | Performed by: NURSE PRACTITIONER

## 2023-11-29 PROCEDURE — 3074F SYST BP LT 130 MM HG: CPT | Performed by: NURSE PRACTITIONER

## 2023-11-29 PROCEDURE — 99214 OFFICE O/P EST MOD 30 MIN: CPT | Performed by: NURSE PRACTITIONER

## 2023-11-29 PROCEDURE — 1160F RVW MEDS BY RX/DR IN RCRD: CPT | Performed by: NURSE PRACTITIONER

## 2023-11-29 PROCEDURE — 1036F TOBACCO NON-USER: CPT | Performed by: NURSE PRACTITIONER

## 2023-11-29 PROCEDURE — 3078F DIAST BP <80 MM HG: CPT | Performed by: NURSE PRACTITIONER

## 2023-11-29 ASSESSMENT — ENCOUNTER SYMPTOMS
SHORTNESS OF BREATH: 0
LOSS OF SENSATION IN FEET: 0
CHILLS: 0
DIARRHEA: 0
OCCASIONAL FEELINGS OF UNSTEADINESS: 0
VOMITING: 0
FEVER: 0
DEPRESSION: 0
ABDOMINAL PAIN: 0
NAUSEA: 0
COUGH: 0

## 2023-11-29 ASSESSMENT — PATIENT HEALTH QUESTIONNAIRE - PHQ9
10. IF YOU CHECKED OFF ANY PROBLEMS, HOW DIFFICULT HAVE THESE PROBLEMS MADE IT FOR YOU TO DO YOUR WORK, TAKE CARE OF THINGS AT HOME, OR GET ALONG WITH OTHER PEOPLE: NOT DIFFICULT AT ALL
1. LITTLE INTEREST OR PLEASURE IN DOING THINGS: NOT AT ALL
SUM OF ALL RESPONSES TO PHQ9 QUESTIONS 1 AND 2: 0
2. FEELING DOWN, DEPRESSED OR HOPELESS: NOT AT ALL

## 2023-11-29 NOTE — PATIENT INSTRUCTIONS
Recommend you follow up with Vascular surgery regarding aneurysmal dilation on CT imaging  Recommend follow up with General surgeon Dr. Montemayor regarding hiatal hernia  Follow up with Urology as scheduled  Call if you develop new or worsening symptoms

## 2023-11-29 NOTE — ASSESSMENT & PLAN NOTE
Finding on CT imaging 11/25/2023  Evaluated by vascular surgery while in ED   Recommended follow up with outpatient vascular surgery  Patient would like to see Dr. Davis

## 2023-11-29 NOTE — ASSESSMENT & PLAN NOTE
Noted on CT imaging while in ER 11/25/2023  Patient has apt with Urology Dr. Pereira On 12/5/2023  Patient denies pain today.

## 2023-11-29 NOTE — PROGRESS NOTES
"Subjective   Chief Complaint: Follow-up ([facility] ED 11/25/23 kidney stones and sm hiatal hernia).    HPI   Av Sampson \"Ed\" is a 78 y.o. male who presents for Follow-up ([facility] 11/25/23 kidney stones and sm hiatal hernia).    Patient presnted to ER on 11/25/23 with LLQ pain, inability to urinate, nausea, dry heaves. While in ER CT abdomen/pelvis showed kidney stones, small hital hernia, aneurysmal dilation and occlusionof the left internal ilkiac artery     Patient reports he has not had any further pain since 11/25/2023. He has not had to take any more of the percocet prescribed to him by the ED.     CT imaging reviewed with patient.  He reports feeling well today. Denies any pain. Patient denies fever, chills, nausea, vomiting, diarrhea, chest pain, heart palpations, or shortness of breath.       He has an apt scheduled with Urology  He is interested in  referral to General surgery for the hernia  He would like to See Dr. Davis for his iliac aneurysm dilation.           Review of Systems   Constitutional:  Negative for chills and fever.   Respiratory:  Negative for cough and shortness of breath.    Cardiovascular:  Negative for chest pain.   Gastrointestinal:  Negative for abdominal pain, diarrhea, nausea and vomiting.       Objective   /67 (BP Location: Left arm, Patient Position: Sitting, BP Cuff Size: Adult)   Pulse 70   Temp 36.4 °C (97.6 °F)   Resp 16   Ht 1.829 m (6')   Wt 116 kg (256 lb)   SpO2 93%   BMI 34.72 kg/m²   BSA Body surface area is 2.43 meters squared.      Physical Exam  Constitutional:       Appearance: Normal appearance.   Cardiovascular:      Rate and Rhythm: Normal rate and regular rhythm.   Pulmonary:      Effort: Pulmonary effort is normal.      Breath sounds: Normal breath sounds.   Abdominal:      General: Abdomen is flat.      Palpations: Abdomen is soft.   Neurological:      Mental Status: He is alert.       Lab on 10/09/2023   Component Date Value Ref " Range Status    BNP 10/09/2023 21  0 - 99 pg/mL Final   Lab on 07/07/2023   Component Date Value Ref Range Status    TSH 07/07/2023 2.80  0.44 - 3.98 mIU/L Final     TSH testing is performed using different testing    methodology at [facility] than at other    [facility]. Direct result comparisons should    only be made within the same method.   Orders Only on 02/21/2023   Component Date Value Ref Range Status    Free T4 02/21/2023 0.86  0.78 - 1.48 ng/dL Final    Comment:  Thyroxine Free testing is performed using different testing    methodology at [facility] than at other    [facility]. Direct result comparisons should    only be made within the same method.     Orders Only on 02/21/2023   Component Date Value Ref Range Status    Cholesterol 02/21/2023 150  0 - 199 mg/dL Final    Comment: .      AGE      DESIRABLE   BORDERLINE HIGH   HIGH     0-19 Y     0 - 169       170 - 199     >/= 200    20-24 Y     0 - 189       190 - 224     >/= 225         >24 Y     0 - 199       200 - 239     >/= 240   **All ranges are based on fasting samples. Specific   therapeutic targets will vary based on patient-specific   cardiac risk.  .   Pediatric guidelines reference:Pediatrics 2011, 128(S5).   Adult guidelines reference: NCEP ATPIII Guidelines,     KEESHA 2001, 258:2486-97  .   Venipuncture immediately after or during the    administration of Metamizole may lead to falsely   low results. Testing should be performed immediately   prior to Metamizole dosing.      HDL 02/21/2023 42.3  mg/dL Final    Comment: .      AGE      VERY LOW   LOW     NORMAL    HIGH       0-19 Y       < 35   < 40     40-45     ----    20-24 Y       ----   < 40       >45     ----      >24 Y       ----   < 40     40-60      >60  .      Cholesterol/HDL Ratio 02/21/2023 3.5   Final    Comment: REF VALUES  DESIRABLE  < 3.4  HIGH RISK  > 5.0      LDL 02/21/2023 76  0 - 99 mg/dL Final    Comment: .                            NEAR      BORD      AGE      DESIRABLE  OPTIMAL    HIGH     HIGH     VERY HIGH     0-19 Y     0 - 109     ---    110-129   >/= 130     ----    20-24 Y     0 - 119     ---    120-159   >/= 160     ----      >24 Y     0 -  99   100-129  130-159   160-189     >/=190  .      VLDL 02/21/2023 32  0 - 40 mg/dL Final    Triglycerides 02/21/2023 159 (H)  0 - 149 mg/dL Final    Comment: .      AGE      DESIRABLE   BORDERLINE HIGH   HIGH     VERY HIGH   0 D-90 D    19 - 174         ----         ----        ----  91 D- 9 Y     0 -  74        75 -  99     >/= 100      ----    10-19 Y     0 -  89        90 - 129     >/= 130      ----    20-24 Y     0 - 114       115 - 149     >/= 150      ----         >24 Y     0 - 149       150 - 199    200- 499    >/= 500  .   Venipuncture immediately after or during the    administration of Metamizole may lead to falsely   low results. Testing should be performed immediately   prior to Metamizole dosing.     Orders Only on 02/21/2023   Component Date Value Ref Range Status    WBC 02/21/2023 7.2  4.4 - 11.3 x10E9/L Final    nRBC 02/21/2023 0.0  0.0 - 0.0 /100 WBC Final    RBC 02/21/2023 4.93  4.50 - 5.90 x10E12/L Final    Hemoglobin 02/21/2023 15.1  13.5 - 17.5 g/dL Final    Hematocrit 02/21/2023 46.4  41.0 - 52.0 % Final    MCV 02/21/2023 94  80 - 100 fL Final    MCHC 02/21/2023 32.5  32.0 - 36.0 g/dL Final    Platelets 02/21/2023 220  150 - 450 x10E9/L Final    RDW 02/21/2023 14.3  11.5 - 14.5 % Final    Neutrophils % 02/21/2023 42.2  40.0 - 80.0 % Final    Immature Granulocytes %, Automated 02/21/2023 0.1  0.0 - 0.9 % Final    Comment:  Immature Granulocyte Count (IG) includes promyelocytes,    myelocytes and metamyelocytes but does not include bands.   Percent differential counts (%) should be interpreted in the   context of the absolute cell counts (cells/L).      Lymphocytes % 02/21/2023 45.6  13.0 - 44.0 % Final    Monocytes % 02/21/2023 7.4  2.0 - 10.0 % Final    Eosinophils %  02/21/2023 4.1  0.0 - 6.0 % Final    Basophils % 02/21/2023 0.6  0.0 - 2.0 % Final    Neutrophils Absolute 02/21/2023 3.02  1.60 - 5.50 x10E9/L Final    Lymphocytes Absolute 02/21/2023 3.26 (H)  0.80 - 3.00 x10E9/L Final    Monocytes Absolute 02/21/2023 0.53  0.05 - 0.80 x10E9/L Final    Eosinophils Absolute 02/21/2023 0.29  0.00 - 0.40 x10E9/L Final    Basophils Absolute 02/21/2023 0.04  0.00 - 0.10 x10E9/L Final   Orders Only on 02/21/2023   Component Date Value Ref Range Status    TSH 02/21/2023 4.25 (H)  0.44 - 3.98 mIU/L Final    Comment:  TSH testing is performed using different testing    methodology at [facility] than at other    [facility]. Direct result comparisons should    only be made within the same method.     Orders Only on 02/21/2023   Component Date Value Ref Range Status    Glucose 02/21/2023 133 (H)  74 - 99 mg/dL Final    Sodium 02/21/2023 140  136 - 145 mmol/L Final    Potassium 02/21/2023 4.3  3.5 - 5.3 mmol/L Final    Chloride 02/21/2023 101  98 - 107 mmol/L Final    Bicarbonate 02/21/2023 32  21 - 32 mmol/L Final    Anion Gap 02/21/2023 11  10 - 20 mmol/L Final    Urea Nitrogen 02/21/2023 17  6 - 23 mg/dL Final    Creatinine 02/21/2023 1.18  0.50 - 1.30 mg/dL Final    GFR MALE 02/21/2023 63  >90 mL/min/1.73m2 Final    Comment:  CALCULATIONS OF ESTIMATED GFR ARE PERFORMED   USING THE 2021 CKD-EPI STUDY REFIT EQUATION   WITHOUT THE RACE VARIABLE FOR THE IDMS-TRACEABLE   CREATININE METHODS.    https://jasn.asnjournals.org/content/early/2021/09/22/ASN.0719009353      Calcium 02/21/2023 9.7  8.6 - 10.6 mg/dL Final    Albumin 02/21/2023 4.3  3.4 - 5.0 g/dL Final    Alkaline Phosphatase 02/21/2023 48  33 - 136 U/L Final    Total Protein 02/21/2023 6.6  6.4 - 8.2 g/dL Final    AST 02/21/2023 18  9 - 39 U/L Final    Total Bilirubin 02/21/2023 0.4  0.0 - 1.2 mg/dL Final    ALT (SGPT) 02/21/2023 17  10 - 52 U/L Final    Comment:  Patients treated with Sulfasalazine may generate     falsely decreased results for ALT.     Orders Only on 02/21/2023   Component Date Value Ref Range Status    Prostate Specific Antigen,Screen 02/21/2023 2.14  0.00 - 4.00 ng/mL Final    Comment: The FDA requires that the method used for PSA assay be   reported to the physician. Values obtained with different   assay methods must not be used interchangeably. This test   was performed at [facility] using the Siemens  AtilektllAQH PSA method, which is a sandwich immunoassay using   chemiluminescence for quantitation. The assay is approved  for measurement of prostate-specific antigen (PSA) in   serum and may be used in conjunction with a digital rectal  examination in men 50 years and older as an aid in   detection of prostate cancer.   5-Alpha-reductase inhibitors (e.g. Proscar, Finasteride,   Avodart, Dutasteride and Adalgisa) for the treatment of BPH   have been shown to lower PSA levels by an average of 50%   after 6 months of treatment.     Orders Only on 02/21/2023   Component Date Value Ref Range Status    Hemoglobin A1C 02/21/2023 6.8 (A)  % Final    Comment:      Diagnosis of Diabetes-Adults   Non-Diabetic: < or = 5.6%   Increased risk for developing diabetes: 5.7-6.4%   Diagnostic of diabetes: > or = 6.5%  .       Monitoring of Diabetes                Age (y)     Therapeutic Goal (%)   Adults:          >18           <7.0   Pediatrics:    13-18           <7.5                   7-12           <8.0                   0- 6            7.5-8.5   American Diabetes Association. Diabetes Care 33(S1), Jan 2010.      Estimated Average Glucose 02/21/2023 148  MG/DL Final     Current Outpatient Medications on File Prior to Visit   Medication Sig Dispense Refill    apixaban (Eliquis) 5 mg tablet Take 1 tablet (5 mg) by mouth 2 times a day.      aspirin 81 mg EC tablet Take 1 tablet (81 mg) by mouth once daily.      furosemide (Lasix) 20 mg tablet Take 1 tablet (20 mg) by mouth once daily. 30 tablet 1     hydroCHLOROthiazide (HYDRODiuril) 25 mg tablet Take 1 tablet (25 mg) by mouth once daily.      losartan (Cozaar) 100 mg tablet Take 1 tablet (100 mg) by mouth once daily.      meclizine (Antivert) 25 mg tablet Take 1 tablet (25 mg) by mouth 3 times a day as needed.      metFORMIN (Glucophage) 500 mg tablet Take 2 tablets (1,000 mg) by mouth 2 times a day.      mometasone (Elocon) 0.1 % ointment Apply to eyelid at bedtime 15 g 1    mometasone (Elocon) 0.1 % ointment Apply to eyelid at bedtime. 15 g 0    multivitamin capsule Take 1 capsule by mouth once daily.      OneTouch Ultra Test strip TEST BLOOD SUGAR ONCE DAILY 100 strip 0    primidone (Mysoline) 50 mg tablet Take 1 tablet (50 mg) by mouth 3 times a day.      simvastatin (Zocor) 20 mg tablet Take 1 tablet (20 mg) by mouth once daily. 90 tablet 1     No current facility-administered medications on file prior to visit.     No images are attached to the encounter.            Assessment/Plan   Problem List Items Addressed This Visit             ICD-10-CM    Nephrolithiasis - Primary N20.0     Noted on CT imaging while in ER 11/25/2023  Patient has apt with Urology Dr. Pereira On 12/5/2023  Patient denies pain today.          Iliac artery aneurysm (CMS/HCC) I72.3     Finding on CT imaging 11/25/2023  Evaluated by vascular surgery while in ED   Recommended follow up with outpatient vascular surgery  Patient would like to see Dr. Davis          Hiatal hernia K44.9     Small hernia noted on Ct imaging  Referral to General surgery Dr Montemayor         Relevant Orders    Referral to General Surgery

## 2023-12-04 ENCOUNTER — TELEPHONE (OUTPATIENT)
Dept: PRIMARY CARE | Facility: CLINIC | Age: 78
End: 2023-12-04
Payer: MEDICARE

## 2023-12-04 DIAGNOSIS — K44.9 HIATAL HERNIA: ICD-10-CM

## 2023-12-04 NOTE — TELEPHONE ENCOUNTER
Patient saw QUINN De Los Santos last week for a hiatal hernia, referred patient to Dr Zia Montemayor. Would like to see someone within [facility] because any follow up appt with Dr Montemayor would be in [place]. Does not want to travel.

## 2023-12-19 DIAGNOSIS — I48.91 ATRIAL FIBRILLATION, UNSPECIFIED TYPE (MULTI): Primary | ICD-10-CM

## 2024-01-02 ENCOUNTER — APPOINTMENT (OUTPATIENT)
Dept: SURGERY | Facility: CLINIC | Age: 79
End: 2024-01-02
Payer: MEDICARE

## 2024-01-03 ENCOUNTER — DOCUMENTATION (OUTPATIENT)
Dept: CARE COORDINATION | Facility: CLINIC | Age: 79
End: 2024-01-03
Payer: MEDICARE

## 2024-01-04 ENCOUNTER — SPECIALTY PHARMACY (OUTPATIENT)
Dept: PHARMACY | Facility: CLINIC | Age: 79
End: 2024-01-04

## 2024-01-05 PROCEDURE — RXMED WILLOW AMBULATORY MEDICATION CHARGE

## 2024-01-06 ENCOUNTER — PHARMACY VISIT (OUTPATIENT)
Dept: PHARMACY | Facility: CLINIC | Age: 79
End: 2024-01-06
Payer: COMMERCIAL

## 2024-01-09 PROCEDURE — RXMED WILLOW AMBULATORY MEDICATION CHARGE

## 2024-01-11 ENCOUNTER — PHARMACY VISIT (OUTPATIENT)
Dept: PHARMACY | Facility: CLINIC | Age: 79
End: 2024-01-11
Payer: COMMERCIAL

## 2024-02-16 DIAGNOSIS — I10 PRIMARY HYPERTENSION: Primary | ICD-10-CM

## 2024-02-16 RX ORDER — HYDROCHLOROTHIAZIDE 25 MG/1
25 TABLET ORAL DAILY
Qty: 90 TABLET | Refills: 3 | Status: SHIPPED | OUTPATIENT
Start: 2024-02-16

## 2024-02-20 NOTE — PROGRESS NOTES
"Subjective   Patient ID: Av Sampson \"Ed\" is a 78 y.o. male who presents for Follow-up (Want to speak about referrals).    HPI   Patient comes in stating that he is here to discuss several referrals he has been given.  He has followed up with Urology for his kidney stones.  He is wanting to discuss his hiatal hernia and was given referral for surgeon. He is seeing Dr. Gonsales's nurse practitioner for evaluation and is going to be getting a endoscopy to evaluate his hiatal hernia.  He would like to See Dr. Davis for his iliac aneurysm dilation.     Patient did have another ER visit end of December that showed he had vertigo was given meclizine did have some improvement.  Patient was sent to physical therapy and also to neurology because there was concern for left vertebral artery stenosis.  After further investigation it was deemed that he had had this for quite some time and not anything that was new.  He was told to continue on his statin therapy. He is interested in seeing ENT for his vertigo.    Review of Systems   Constitutional:  Negative for activity change, appetite change, chills and fever.   HENT:  Negative for congestion, ear pain and facial swelling.    Eyes:  Negative for discharge.   Respiratory:  Negative for cough and wheezing.    Cardiovascular:  Negative for chest pain, palpitations and leg swelling.   Gastrointestinal:  Negative for constipation and diarrhea.   Musculoskeletal:  Negative for arthralgias.   Skin:  Negative for color change and pallor.   Neurological:  Negative for syncope.   Psychiatric/Behavioral:  Negative for confusion.        Objective   /54   Pulse 64   Temp 36.7 °C (98.1 °F)   Wt 116 kg (256 lb)   SpO2 94%   BMI 34.72 kg/m²   BSA Body surface area is 2.43 meters squared.      Physical Exam  Constitutional:       General: He is not in acute distress.     Appearance: Normal appearance. He is not toxic-appearing.   HENT:      Head: Normocephalic.      Right Ear: " Tympanic membrane, ear canal and external ear normal.      Left Ear: Tympanic membrane, ear canal and external ear normal.   Eyes:      Conjunctiva/sclera: Conjunctivae normal.      Pupils: Pupils are equal, round, and reactive to light.   Cardiovascular:      Rate and Rhythm: Normal rate and regular rhythm.      Pulses: Normal pulses.      Heart sounds: Normal heart sounds.   Pulmonary:      Effort: No respiratory distress.      Breath sounds: No wheezing, rhonchi or rales.   Musculoskeletal:         General: No swelling or tenderness.   Skin:     Findings: No lesion or rash.   Neurological:      General: No focal deficit present.      Mental Status: He is alert and oriented to person, place, and time. Mental status is at baseline.      Gait: Gait normal.   Psychiatric:         Mood and Affect: Mood normal.         Behavior: Behavior normal.         Thought Content: Thought content normal.         Judgment: Judgment normal.       Lab on 10/09/2023   Component Date Value Ref Range Status    BNP 10/09/2023 21  0 - 99 pg/mL Final   Lab on 07/07/2023   Component Date Value Ref Range Status    TSH 07/07/2023 2.80  0.44 - 3.98 mIU/L Final     TSH testing is performed using different testing    methodology at [facility] than at other    [facility]. Direct result comparisons should    only be made within the same method.     Current Outpatient Medications on File Prior to Visit   Medication Sig Dispense Refill    acetaminophen (Tylenol) 500 mg tablet Take 2 tablets (1,000 mg) by mouth every 8 hours if needed.      apixaban (Eliquis) 5 mg tablet Take 1 tablet (5 mg) by mouth 2 times a day. 180 tablet 1    aspirin 81 mg EC tablet Take 1 tablet (81 mg) by mouth once daily.      fluorouracil (Efudex) 5 % cream       furosemide (Lasix) 20 mg tablet Take 1 tablet (20 mg) by mouth once daily. 30 tablet 1    hydroCHLOROthiazide (HYDRODiuril) 25 mg tablet TAKE 1 TABLET BY MOUTH DAILY 90 tablet 3    losartan  (Cozaar) 100 mg tablet Take 1 tablet (100 mg) by mouth once daily.      meclizine (Antivert) 25 mg tablet Take 1 tablet (25 mg) by mouth 3 times a day as needed.      metFORMIN (Glucophage) 500 mg tablet Take 2 tablets (1,000 mg) by mouth 2 times a day.      mometasone (Elocon) 0.1 % ointment Apply to eyelid at bedtime. 15 g 0    multivitamin capsule Take 1 capsule by mouth once daily.      multivitamin with minerals capsule Take by mouth once daily.      OneTouch Ultra Test strip TEST BLOOD SUGAR ONCE DAILY 100 strip 0    primidone (Mysoline) 50 mg tablet Take 1 tablet (50 mg) by mouth 3 times a day.      simvastatin (Zocor) 20 mg tablet Take 1 tablet (20 mg) by mouth once daily. 90 tablet 1    [DISCONTINUED] hydroCHLOROthiazide (HYDRODiuril) 25 mg tablet Take 1 tablet (25 mg) by mouth once daily.      [DISCONTINUED] mometasone (Elocon) 0.1 % ointment Apply to eyelid at bedtime 15 g 1     No current facility-administered medications on file prior to visit.     No images are attached to the encounter.            Assessment/Plan   Diagnoses and all orders for this visit:  Hiatal hernia  Iliac artery aneurysm (CMS/HCC)  Nephrolithiasis  Benign paroxysmal positional vertigo, unspecified laterality  Patient would benefit from seeing Dr. Ponce for further evaluation of his vertigo  Patient to call for questions or concerns

## 2024-02-21 ENCOUNTER — OFFICE VISIT (OUTPATIENT)
Dept: PRIMARY CARE | Facility: CLINIC | Age: 79
End: 2024-02-21
Payer: MEDICARE

## 2024-02-21 VITALS
BODY MASS INDEX: 34.72 KG/M2 | WEIGHT: 256 LBS | TEMPERATURE: 98.1 F | HEART RATE: 64 BPM | SYSTOLIC BLOOD PRESSURE: 100 MMHG | OXYGEN SATURATION: 94 % | DIASTOLIC BLOOD PRESSURE: 54 MMHG

## 2024-02-21 DIAGNOSIS — K44.9 HIATAL HERNIA: Primary | ICD-10-CM

## 2024-02-21 DIAGNOSIS — N20.0 NEPHROLITHIASIS: ICD-10-CM

## 2024-02-21 DIAGNOSIS — I72.3 ILIAC ARTERY ANEURYSM (CMS-HCC): ICD-10-CM

## 2024-02-21 DIAGNOSIS — H81.10 BENIGN PAROXYSMAL POSITIONAL VERTIGO, UNSPECIFIED LATERALITY: ICD-10-CM

## 2024-02-21 PROCEDURE — 1036F TOBACCO NON-USER: CPT | Performed by: FAMILY MEDICINE

## 2024-02-21 PROCEDURE — 1160F RVW MEDS BY RX/DR IN RCRD: CPT | Performed by: FAMILY MEDICINE

## 2024-02-21 PROCEDURE — 1159F MED LIST DOCD IN RCRD: CPT | Performed by: FAMILY MEDICINE

## 2024-02-21 PROCEDURE — 99214 OFFICE O/P EST MOD 30 MIN: CPT | Performed by: FAMILY MEDICINE

## 2024-02-21 PROCEDURE — 3074F SYST BP LT 130 MM HG: CPT | Performed by: FAMILY MEDICINE

## 2024-02-21 PROCEDURE — 3078F DIAST BP <80 MM HG: CPT | Performed by: FAMILY MEDICINE

## 2024-02-21 PROCEDURE — 1157F ADVNC CARE PLAN IN RCRD: CPT | Performed by: FAMILY MEDICINE

## 2024-02-21 RX ORDER — ACETAMINOPHEN 500 MG
1000 TABLET ORAL EVERY 8 HOURS PRN
COMMUNITY

## 2024-02-21 RX ORDER — FLUOROURACIL 50 MG/G
CREAM TOPICAL
COMMUNITY
Start: 2024-01-11

## 2024-02-21 ASSESSMENT — ENCOUNTER SYMPTOMS
CONSTIPATION: 0
OCCASIONAL FEELINGS OF UNSTEADINESS: 0
COUGH: 0
FACIAL SWELLING: 0
WHEEZING: 0
DIARRHEA: 0
COLOR CHANGE: 0
ACTIVITY CHANGE: 0
ARTHRALGIAS: 0
DEPRESSION: 0
PALPITATIONS: 0
APPETITE CHANGE: 0
CHILLS: 0
CONFUSION: 0
LOSS OF SENSATION IN FEET: 0
FEVER: 0
EYE DISCHARGE: 0

## 2024-02-21 ASSESSMENT — PATIENT HEALTH QUESTIONNAIRE - PHQ9
1. LITTLE INTEREST OR PLEASURE IN DOING THINGS: NOT AT ALL
SUM OF ALL RESPONSES TO PHQ9 QUESTIONS 1 AND 2: 0
10. IF YOU CHECKED OFF ANY PROBLEMS, HOW DIFFICULT HAVE THESE PROBLEMS MADE IT FOR YOU TO DO YOUR WORK, TAKE CARE OF THINGS AT HOME, OR GET ALONG WITH OTHER PEOPLE: NOT DIFFICULT AT ALL
2. FEELING DOWN, DEPRESSED OR HOPELESS: NOT AT ALL

## 2024-03-01 ENCOUNTER — TELEPHONE (OUTPATIENT)
Dept: PRIMARY CARE | Facility: CLINIC | Age: 79
End: 2024-03-01
Payer: MEDICARE

## 2024-03-01 NOTE — TELEPHONE ENCOUNTER
Patient wanted to check his vitamin d level, his physical therapist thinks he could be lacking vitamin d.

## 2024-03-04 DIAGNOSIS — M19.90 ARTHRITIS: ICD-10-CM

## 2024-03-04 DIAGNOSIS — I10 PRIMARY HYPERTENSION: Primary | ICD-10-CM

## 2024-03-04 DIAGNOSIS — R26.81 GAIT INSTABILITY: ICD-10-CM

## 2024-03-04 DIAGNOSIS — R79.89 ELEVATED TSH: ICD-10-CM

## 2024-03-04 DIAGNOSIS — N20.0 NEPHROLITHIASIS: ICD-10-CM

## 2024-03-04 DIAGNOSIS — R26.89 BALANCE PROBLEMS: ICD-10-CM

## 2024-03-04 DIAGNOSIS — E66.9 CLASS 1 OBESITY WITH BODY MASS INDEX (BMI) OF 34.0 TO 34.9 IN ADULT, UNSPECIFIED OBESITY TYPE, UNSPECIFIED WHETHER SERIOUS COMORBIDITY PRESENT: ICD-10-CM

## 2024-03-04 DIAGNOSIS — M17.12 OSTEOARTHRITIS OF LEFT KNEE, UNSPECIFIED OSTEOARTHRITIS TYPE: ICD-10-CM

## 2024-03-04 RX ORDER — LOSARTAN POTASSIUM 100 MG/1
100 TABLET ORAL DAILY
Qty: 90 TABLET | Refills: 3 | Status: SHIPPED | OUTPATIENT
Start: 2024-03-04

## 2024-03-05 ENCOUNTER — TELEPHONE (OUTPATIENT)
Dept: PRIMARY CARE | Facility: CLINIC | Age: 79
End: 2024-03-05
Payer: MEDICARE

## 2024-03-05 NOTE — TELEPHONE ENCOUNTER
Please schedule patient for [facility] wellness visit in Jul 2024. Schedule with Dr. Green Please send this encounter to Dr. Green for lab orders once scheduled.     Thank you-  Nikita Farmer CMA  3/5/2024  Practice Supervisor  [facility]

## 2024-03-08 ENCOUNTER — TELEPHONE (OUTPATIENT)
Dept: PRIMARY CARE | Facility: CLINIC | Age: 79
End: 2024-03-08
Payer: MEDICARE

## 2024-03-08 NOTE — TELEPHONE ENCOUNTER
Pt called in wanting to know if he can get a blood work order to check the vitamin D levels?  His PT provider is checking everything on what could be the cause of patient vertigo and would like to rule the vitamin d level out.     Pt would like to come in next week down stairs to get his blood done for the vitamin d level    Pt number: [phone]

## 2024-03-11 ENCOUNTER — LAB (OUTPATIENT)
Dept: LAB | Facility: LAB | Age: 79
End: 2024-03-11
Payer: MEDICARE

## 2024-03-11 DIAGNOSIS — R42 VERTIGO: ICD-10-CM

## 2024-03-11 DIAGNOSIS — M19.90 ARTHRITIS: ICD-10-CM

## 2024-03-11 DIAGNOSIS — H81.10 BENIGN PAROXYSMAL POSITIONAL VERTIGO, UNSPECIFIED LATERALITY: ICD-10-CM

## 2024-03-11 DIAGNOSIS — R27.0 ATAXIA: ICD-10-CM

## 2024-03-11 DIAGNOSIS — E66.9 CLASS 1 OBESITY WITH BODY MASS INDEX (BMI) OF 34.0 TO 34.9 IN ADULT, UNSPECIFIED OBESITY TYPE, UNSPECIFIED WHETHER SERIOUS COMORBIDITY PRESENT: ICD-10-CM

## 2024-03-11 PROCEDURE — 82306 VITAMIN D 25 HYDROXY: CPT

## 2024-03-11 PROCEDURE — 36415 COLL VENOUS BLD VENIPUNCTURE: CPT

## 2024-03-12 LAB — 25(OH)D3 SERPL-MCNC: 40 NG/ML (ref 30–100)

## 2024-03-29 PROCEDURE — RXMED WILLOW AMBULATORY MEDICATION CHARGE

## 2024-04-02 ENCOUNTER — PHARMACY VISIT (OUTPATIENT)
Dept: PHARMACY | Facility: CLINIC | Age: 79
End: 2024-04-02
Payer: COMMERCIAL

## 2024-04-05 ENCOUNTER — TELEPHONE (OUTPATIENT)
Dept: PRIMARY CARE | Facility: CLINIC | Age: 79
End: 2024-04-05
Payer: MEDICARE

## 2024-04-05 NOTE — TELEPHONE ENCOUNTER
CARLOS Mcnulty with [facility] general left a voicemail stating patient is being treated for Bppv, been there for several weeks and has improved significantly. However he does have a form of a typical bppv, the dizziness he does have left appear to be intractable where he has inconsistent symptoms or have him resolved completely and it would come back. Possibility of having vestibular battery testing if you think that's necessary ?       [phone]

## 2024-04-08 ENCOUNTER — PHARMACY VISIT (OUTPATIENT)
Dept: PHARMACY | Facility: CLINIC | Age: 79
End: 2024-04-08
Payer: COMMERCIAL

## 2024-04-08 DIAGNOSIS — E78.2 MIXED HYPERLIPIDEMIA: ICD-10-CM

## 2024-04-08 PROCEDURE — RXMED WILLOW AMBULATORY MEDICATION CHARGE

## 2024-04-08 RX ORDER — SIMVASTATIN 20 MG/1
20 TABLET, FILM COATED ORAL DAILY
Qty: 90 TABLET | Refills: 1 | Status: SHIPPED | OUTPATIENT
Start: 2024-04-08

## 2024-04-11 ENCOUNTER — SPECIALTY PHARMACY (OUTPATIENT)
Dept: PHARMACY | Facility: CLINIC | Age: 79
End: 2024-04-11

## 2024-04-11 DIAGNOSIS — R42 DIZZINESS: ICD-10-CM

## 2024-04-11 DIAGNOSIS — H81.10 BENIGN PAROXYSMAL POSITIONAL VERTIGO, UNSPECIFIED LATERALITY: ICD-10-CM

## 2024-04-11 DIAGNOSIS — H55.09 HORIZONTAL NYSTAGMUS: ICD-10-CM

## 2024-04-16 DIAGNOSIS — E11.9 TYPE 2 DIABETES MELLITUS WITHOUT COMPLICATION, WITHOUT LONG-TERM CURRENT USE OF INSULIN (MULTI): ICD-10-CM

## 2024-04-16 RX ORDER — BLOOD SUGAR DIAGNOSTIC
STRIP MISCELLANEOUS
Qty: 100 STRIP | Refills: 0 | Status: SHIPPED | OUTPATIENT
Start: 2024-04-16

## 2024-05-12 DIAGNOSIS — E11.9 TYPE 2 DIABETES MELLITUS WITHOUT COMPLICATION, WITHOUT LONG-TERM CURRENT USE OF INSULIN (MULTI): Primary | ICD-10-CM

## 2024-05-13 RX ORDER — METFORMIN HYDROCHLORIDE 500 MG/1
1000 TABLET ORAL 2 TIMES DAILY
Qty: 360 TABLET | Refills: 3 | Status: SHIPPED | OUTPATIENT
Start: 2024-05-13

## 2024-06-05 DIAGNOSIS — E11.9 TYPE 2 DIABETES MELLITUS WITHOUT COMPLICATION, WITHOUT LONG-TERM CURRENT USE OF INSULIN (MULTI): ICD-10-CM

## 2024-06-05 DIAGNOSIS — I10 PRIMARY HYPERTENSION: ICD-10-CM

## 2024-06-05 DIAGNOSIS — R79.89 ABNORMAL TSH: ICD-10-CM

## 2024-06-05 DIAGNOSIS — E78.2 MIXED HYPERLIPIDEMIA: ICD-10-CM

## 2024-06-05 DIAGNOSIS — Z12.5 SCREENING FOR PROSTATE CANCER: ICD-10-CM

## 2024-06-19 ENCOUNTER — LAB (OUTPATIENT)
Dept: LAB | Facility: LAB | Age: 79
End: 2024-06-19
Payer: MEDICARE

## 2024-06-19 DIAGNOSIS — Z12.5 SCREENING FOR PROSTATE CANCER: ICD-10-CM

## 2024-06-19 DIAGNOSIS — R79.89 ABNORMAL TSH: ICD-10-CM

## 2024-06-19 DIAGNOSIS — E78.2 MIXED HYPERLIPIDEMIA: ICD-10-CM

## 2024-06-19 DIAGNOSIS — E11.9 TYPE 2 DIABETES MELLITUS WITHOUT COMPLICATION, WITHOUT LONG-TERM CURRENT USE OF INSULIN (MULTI): ICD-10-CM

## 2024-06-19 DIAGNOSIS — I10 PRIMARY HYPERTENSION: ICD-10-CM

## 2024-06-19 LAB
ALBUMIN SERPL BCP-MCNC: 4.1 G/DL (ref 3.4–5)
ALP SERPL-CCNC: 47 U/L (ref 33–136)
ALT SERPL W P-5'-P-CCNC: 19 U/L (ref 10–52)
ANION GAP SERPL CALC-SCNC: 12 MMOL/L (ref 10–20)
AST SERPL W P-5'-P-CCNC: 20 U/L (ref 9–39)
BASOPHILS # BLD AUTO: 0.04 X10*3/UL (ref 0–0.1)
BASOPHILS NFR BLD AUTO: 0.6 %
BILIRUB SERPL-MCNC: 0.5 MG/DL (ref 0–1.2)
BUN SERPL-MCNC: 18 MG/DL (ref 6–23)
CALCIUM SERPL-MCNC: 9.7 MG/DL (ref 8.6–10.6)
CHLORIDE SERPL-SCNC: 100 MMOL/L (ref 98–107)
CHOLEST SERPL-MCNC: 140 MG/DL (ref 0–199)
CHOLESTEROL/HDL RATIO: 3.7
CO2 SERPL-SCNC: 30 MMOL/L (ref 21–32)
CREAT SERPL-MCNC: 1.21 MG/DL (ref 0.5–1.3)
EGFRCR SERPLBLD CKD-EPI 2021: 61 ML/MIN/1.73M*2
EOSINOPHIL # BLD AUTO: 0.21 X10*3/UL (ref 0–0.4)
EOSINOPHIL NFR BLD AUTO: 3 %
ERYTHROCYTE [DISTWIDTH] IN BLOOD BY AUTOMATED COUNT: 14.9 % (ref 11.5–14.5)
EST. AVERAGE GLUCOSE BLD GHB EST-MCNC: 140 MG/DL
GLUCOSE SERPL-MCNC: 121 MG/DL (ref 74–99)
HBA1C MFR BLD: 6.5 %
HCT VFR BLD AUTO: 46.2 % (ref 41–52)
HDLC SERPL-MCNC: 38 MG/DL
HGB BLD-MCNC: 15.1 G/DL (ref 13.5–17.5)
IMM GRANULOCYTES # BLD AUTO: 0.03 X10*3/UL (ref 0–0.5)
IMM GRANULOCYTES NFR BLD AUTO: 0.4 % (ref 0–0.9)
LDLC SERPL CALC-MCNC: 65 MG/DL
LYMPHOCYTES # BLD AUTO: 2.9 X10*3/UL (ref 0.8–3)
LYMPHOCYTES NFR BLD AUTO: 41.5 %
MCH RBC QN AUTO: 30.9 PG (ref 26–34)
MCHC RBC AUTO-ENTMCNC: 32.7 G/DL (ref 32–36)
MCV RBC AUTO: 95 FL (ref 80–100)
MONOCYTES # BLD AUTO: 0.53 X10*3/UL (ref 0.05–0.8)
MONOCYTES NFR BLD AUTO: 7.6 %
NEUTROPHILS # BLD AUTO: 3.28 X10*3/UL (ref 1.6–5.5)
NEUTROPHILS NFR BLD AUTO: 46.9 %
NON HDL CHOLESTEROL: 102 MG/DL (ref 0–149)
NRBC BLD-RTO: 0 /100 WBCS (ref 0–0)
PLATELET # BLD AUTO: 228 X10*3/UL (ref 150–450)
POTASSIUM SERPL-SCNC: 4 MMOL/L (ref 3.5–5.3)
PROT SERPL-MCNC: 6.8 G/DL (ref 6.4–8.2)
PSA SERPL-MCNC: 2.98 NG/ML
RBC # BLD AUTO: 4.89 X10*6/UL (ref 4.5–5.9)
SODIUM SERPL-SCNC: 138 MMOL/L (ref 136–145)
TRIGL SERPL-MCNC: 186 MG/DL (ref 0–149)
TSH SERPL-ACNC: 3.82 MIU/L (ref 0.44–3.98)
VLDL: 37 MG/DL (ref 0–40)
WBC # BLD AUTO: 7 X10*3/UL (ref 4.4–11.3)

## 2024-06-19 PROCEDURE — G0103 PSA SCREENING: HCPCS

## 2024-06-19 PROCEDURE — 36415 COLL VENOUS BLD VENIPUNCTURE: CPT

## 2024-06-19 PROCEDURE — 84443 ASSAY THYROID STIM HORMONE: CPT

## 2024-06-19 PROCEDURE — 83036 HEMOGLOBIN GLYCOSYLATED A1C: CPT

## 2024-06-19 PROCEDURE — 80061 LIPID PANEL: CPT

## 2024-06-19 PROCEDURE — 85025 COMPLETE CBC W/AUTO DIFF WBC: CPT

## 2024-06-19 PROCEDURE — 80053 COMPREHEN METABOLIC PANEL: CPT

## 2024-06-27 DIAGNOSIS — I48.91 ATRIAL FIBRILLATION, UNSPECIFIED TYPE (MULTI): ICD-10-CM

## 2024-06-27 PROCEDURE — RXMED WILLOW AMBULATORY MEDICATION CHARGE

## 2024-06-28 ENCOUNTER — PHARMACY VISIT (OUTPATIENT)
Dept: PHARMACY | Facility: CLINIC | Age: 79
End: 2024-06-28
Payer: COMMERCIAL

## 2024-06-29 ASSESSMENT — ENCOUNTER SYMPTOMS
ARTHRALGIAS: 0
DIARRHEA: 0
EYE DISCHARGE: 0
COLOR CHANGE: 0
APPETITE CHANGE: 0
WHEEZING: 0
CHILLS: 0
CONSTIPATION: 0
CONFUSION: 0
PALPITATIONS: 0
FACIAL SWELLING: 0
COUGH: 0
FEVER: 0
ACTIVITY CHANGE: 0

## 2024-06-29 NOTE — PROGRESS NOTES
"Subjective   Reason for Visit: Av Sampson is an 79 y.o. male here for a Medicare Wellness visit.     Past Medical, Surgical, and Family History reviewed and updated in chart.    Reviewed all medications by prescribing practitioner or clinical pharmacist (such as prescriptions, OTCs, herbal therapies and supplements) and documented in the medical record.    HPI  Patient presents for physical exam.      Fam Hx  Mom (96) , HTN  Dad (88) , HTN     Exercise 1/2 hour walking  ETOH denies  Caffeine 1 soda/day  Tobacco cigars 2 years     Dr. Guerrero, ophthalmology April/May      Colonoscopy Dr. Gonsales 2016 \"does not need again per Dr. Gonsales\"     Patient denies any other complaints.   Patient Self Assessment of Health Status  Patient Self Assessment: Good    Nutrition and Exercise  Current Diet: Well Balanced Diet  Adequate Fluid Intake: Yes  Caffeine: Yes  Exercise Frequency: Regularly    Functional Ability/Level of Safety  Cognitive Impairment Observed: No cognitive impairment observed  Cognitive Impairment Reported: No cognitive impairment reported by patient or family    Home Safety Risk Factors: None    Patient Care Team:  Suzie Green DO as PCP - General  Suzie Green DO as PCP - MSS[facility] ACO Attributed Provider     Review of Systems   Constitutional:  Negative for activity change, appetite change, chills and fever.   HENT:  Negative for congestion, ear pain and facial swelling.    Eyes:  Negative for discharge.   Respiratory:  Negative for cough and wheezing.    Cardiovascular:  Negative for chest pain, palpitations and leg swelling.   Gastrointestinal:  Negative for constipation and diarrhea.   Musculoskeletal:  Negative for arthralgias.   Skin:  Negative for color change and pallor.   Neurological:  Negative for syncope.   Psychiatric/Behavioral:  Negative for confusion.        Objective   Vitals:  /66 (BP Location: Left arm)   Pulse 60   Ht 1.816 m (5' 11.5\")   Wt 117 kg (258 lb 6.4 " oz)   BMI 35.54 kg/m²       Physical Exam  Constitutional:       General: He is not in acute distress.     Appearance: Normal appearance. He is not toxic-appearing.   HENT:      Head: Normocephalic.      Right Ear: Tympanic membrane, ear canal and external ear normal.      Left Ear: Tympanic membrane, ear canal and external ear normal.      Nose: Nose normal.      Mouth/Throat:      Pharynx: Oropharynx is clear.   Eyes:      Conjunctiva/sclera: Conjunctivae normal.      Pupils: Pupils are equal, round, and reactive to light.   Cardiovascular:      Rate and Rhythm: Normal rate and regular rhythm.      Pulses: Normal pulses.      Heart sounds: Normal heart sounds.   Pulmonary:      Effort: No respiratory distress.      Breath sounds: No wheezing, rhonchi or rales.   Abdominal:      General: Bowel sounds are normal. There is no distension.      Palpations: Abdomen is soft.      Tenderness: There is no abdominal tenderness.   Musculoskeletal:         General: No swelling or tenderness.      Cervical back: No tenderness.   Skin:     Findings: No lesion or rash.   Neurological:      General: No focal deficit present.      Mental Status: He is alert and oriented to person, place, and time. Mental status is at baseline.      Gait: Gait normal.   Psychiatric:         Mood and Affect: Mood normal.         Behavior: Behavior normal.         Thought Content: Thought content normal.         Judgment: Judgment normal.         Assessment/Plan   Problem List Items Addressed This Visit       Benign enlargement of prostate    BPPV (benign paroxysmal positional vertigo)    Current Assessment & Plan     Sees Dr. Ponce, ENT in August 2024 for VNG         Hyperlipidemia    Hypertriglyceridemia    Hypertension    MARKUS (obstructive sleep apnea)    Diabetes mellitus (Multi)    Current Assessment & Plan     Hemoglobin A1c 6.5  Stable         Kidney lesion, native, right    Current Assessment & Plan     Seeing Dr. Talbot at [facility]          "Pancreatic lesion (HHS-HCC)    Current Assessment & Plan     MRI on 9/2023 showed  1.  Stable appearance of small cystic lesions noted in the head and neck   of the pancreas.  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  Recommend continued follow-up with repeat   MRI in one year to continue surveillance   Seeing Dr. Talbot used to see Dr. Weiss at [facility]         Obesity    Hiatal hernia    Iliac artery aneurysm (CMS-HCC)    Current Assessment & Plan     Finding on CT imaging 11/25/2023  Evaluated by vascular surgery while in ED   Recommended follow up with outpatient vascular surgery  Patient sees Dr. 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 Patient's blood work discussed at this office visit  2. Patient's current LDL 65, goal LDL <70  3. , goal TG <150, continue TYPE IV diet and exercise  4. HgbA1c is 6.5, goal <6.5, continue no added sugar diet  5. Creatinine is slightly elevated, this is about the same as last year  6. Liver function is back to normal, continue to monitor  7. Patient sees Dr. Guerrero, ophthalmology April/May yearly  8. Colonoscopy Dr. Gonsales 2016 \"does not need again per Dr. Gonsales\"  9. MRI of pancreas sees Dr. Talbot for follow up due for MRI 9-2024  9. Patient to call if questions or concerns          "

## 2024-07-01 ENCOUNTER — APPOINTMENT (OUTPATIENT)
Dept: PRIMARY CARE | Facility: CLINIC | Age: 79
End: 2024-07-01
Payer: MEDICARE

## 2024-07-01 VITALS
DIASTOLIC BLOOD PRESSURE: 66 MMHG | HEIGHT: 72 IN | HEART RATE: 60 BPM | BODY MASS INDEX: 35 KG/M2 | SYSTOLIC BLOOD PRESSURE: 114 MMHG | WEIGHT: 258.4 LBS

## 2024-07-01 DIAGNOSIS — I50.9 EDEMA DUE TO CONGESTIVE HEART FAILURE (MULTI): ICD-10-CM

## 2024-07-01 DIAGNOSIS — G47.33 OSA (OBSTRUCTIVE SLEEP APNEA): ICD-10-CM

## 2024-07-01 DIAGNOSIS — K86.9 PANCREATIC LESION (HHS-HCC): ICD-10-CM

## 2024-07-01 DIAGNOSIS — E78.1 HYPERTRIGLYCERIDEMIA: ICD-10-CM

## 2024-07-01 DIAGNOSIS — Z00.00 ROUTINE GENERAL MEDICAL EXAMINATION AT HEALTH CARE FACILITY: ICD-10-CM

## 2024-07-01 DIAGNOSIS — E11.51 TYPE 2 DIABETES MELLITUS WITH DIABETIC PERIPHERAL ANGIOPATHY WITHOUT GANGRENE, UNSPECIFIED WHETHER LONG TERM INSULIN USE (MULTI): ICD-10-CM

## 2024-07-01 DIAGNOSIS — E11.9 TYPE 2 DIABETES MELLITUS WITHOUT COMPLICATION, WITHOUT LONG-TERM CURRENT USE OF INSULIN (MULTI): ICD-10-CM

## 2024-07-01 DIAGNOSIS — Z71.89 ADVANCE DIRECTIVE DISCUSSED WITH PATIENT: ICD-10-CM

## 2024-07-01 DIAGNOSIS — H81.10 BENIGN PAROXYSMAL POSITIONAL VERTIGO, UNSPECIFIED LATERALITY: ICD-10-CM

## 2024-07-01 DIAGNOSIS — I10 PRIMARY HYPERTENSION: ICD-10-CM

## 2024-07-01 DIAGNOSIS — K44.9 HIATAL HERNIA: ICD-10-CM

## 2024-07-01 DIAGNOSIS — E78.2 MIXED HYPERLIPIDEMIA: ICD-10-CM

## 2024-07-01 DIAGNOSIS — I72.3 ILIAC ARTERY ANEURYSM (CMS-HCC): ICD-10-CM

## 2024-07-01 DIAGNOSIS — E66.09 CLASS 1 OBESITY DUE TO EXCESS CALORIES WITH SERIOUS COMORBIDITY AND BODY MASS INDEX (BMI) OF 34.0 TO 34.9 IN ADULT: ICD-10-CM

## 2024-07-01 DIAGNOSIS — Z00.00 HEALTHCARE MAINTENANCE: Primary | ICD-10-CM

## 2024-07-01 DIAGNOSIS — N40.0 BENIGN PROSTATIC HYPERPLASIA, UNSPECIFIED WHETHER LOWER URINARY TRACT SYMPTOMS PRESENT: ICD-10-CM

## 2024-07-01 DIAGNOSIS — N28.9 KIDNEY LESION, NATIVE, RIGHT: ICD-10-CM

## 2024-07-01 PROBLEM — R79.89 ELEVATED TSH: Status: RESOLVED | Noted: 2023-02-14 | Resolved: 2024-07-01

## 2024-07-01 PROBLEM — R31.9 BLOOD IN URINE: Status: RESOLVED | Noted: 2023-02-14 | Resolved: 2024-07-01

## 2024-07-01 PROCEDURE — 3078F DIAST BP <80 MM HG: CPT | Performed by: FAMILY MEDICINE

## 2024-07-01 PROCEDURE — 1158F ADVNC CARE PLAN TLK DOCD: CPT | Performed by: FAMILY MEDICINE

## 2024-07-01 PROCEDURE — 1123F ACP DISCUSS/DSCN MKR DOCD: CPT | Performed by: FAMILY MEDICINE

## 2024-07-01 PROCEDURE — 1160F RVW MEDS BY RX/DR IN RCRD: CPT | Performed by: FAMILY MEDICINE

## 2024-07-01 PROCEDURE — 1170F FXNL STATUS ASSESSED: CPT | Performed by: FAMILY MEDICINE

## 2024-07-01 PROCEDURE — 99497 ADVNCD CARE PLAN 30 MIN: CPT | Performed by: FAMILY MEDICINE

## 2024-07-01 PROCEDURE — 1159F MED LIST DOCD IN RCRD: CPT | Performed by: FAMILY MEDICINE

## 2024-07-01 PROCEDURE — 1157F ADVNC CARE PLAN IN RCRD: CPT | Performed by: FAMILY MEDICINE

## 2024-07-01 PROCEDURE — 1036F TOBACCO NON-USER: CPT | Performed by: FAMILY MEDICINE

## 2024-07-01 PROCEDURE — 3074F SYST BP LT 130 MM HG: CPT | Performed by: FAMILY MEDICINE

## 2024-07-01 PROCEDURE — G0439 PPPS, SUBSEQ VISIT: HCPCS | Performed by: FAMILY MEDICINE

## 2024-07-01 ASSESSMENT — ACTIVITIES OF DAILY LIVING (ADL)
DRESSING: INDEPENDENT
MANAGING_FINANCES: INDEPENDENT
BATHING: INDEPENDENT
GROCERY_SHOPPING: INDEPENDENT
TAKING_MEDICATION: INDEPENDENT
DOING_HOUSEWORK: INDEPENDENT

## 2024-07-01 ASSESSMENT — PATIENT HEALTH QUESTIONNAIRE - PHQ9
SUM OF ALL RESPONSES TO PHQ9 QUESTIONS 1 AND 2: 0
1. LITTLE INTEREST OR PLEASURE IN DOING THINGS: NOT AT ALL
2. FEELING DOWN, DEPRESSED OR HOPELESS: NOT AT ALL
10. IF YOU CHECKED OFF ANY PROBLEMS, HOW DIFFICULT HAVE THESE PROBLEMS MADE IT FOR YOU TO DO YOUR WORK, TAKE CARE OF THINGS AT HOME, OR GET ALONG WITH OTHER PEOPLE: NOT DIFFICULT AT ALL

## 2024-07-01 ASSESSMENT — ENCOUNTER SYMPTOMS
LOSS OF SENSATION IN FEET: 0
DEPRESSION: 0
OCCASIONAL FEELINGS OF UNSTEADINESS: 1

## 2024-07-01 NOTE — ACP (ADVANCE CARE PLANNING)
Confirming Previous Code Status:   Advance Care Planning Note     Discussion Date: 07/01/24   Discussion Participants: patient    The patient wishes to discuss Advance Care Planning today and the following is a brief summary of our discussion.     Patient has capacity to make their own medical decisions: Yes  Health Care Agent/Surrogate Decision Maker documented in chart: Yes    Documents on file and valid:  Advance Directive/Living Will: Yes   Health Care Power of : Yes  Other:     Communication of Medical Status/Prognosis:   stable    Communication of Treatment Goals/Options:   stable    Treatment Decisions  Goals of Care: quality of life is prioritized; willing to accept low-burden treatments to achieve meaningful goals     Follow Up Plan  stable  Team Members  stable  Time Statement: Total face to face time spent on advance care planning was 5 minutes with 16 minutes spent in counseling, including the explanation.    Suzie Green DO  7/1/2024 10:13 AM

## 2024-07-01 NOTE — ASSESSMENT & PLAN NOTE
Finding on CT imaging 11/25/2023  Evaluated by vascular surgery while in ED   Recommended follow up with outpatient vascular surgery  Patient sees Dr. Davis

## 2024-07-01 NOTE — ASSESSMENT & PLAN NOTE
MRI on 9/2023 showed  1.  Stable appearance of small cystic lesions noted in the head and neck   of the pancreas.  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  Recommend continued follow-up with repeat   MRI in one year to continue surveillance   Seeing Dr. Talbot used to see Dr. Weiss at [facility]

## 2024-07-12 PROCEDURE — RXMED WILLOW AMBULATORY MEDICATION CHARGE

## 2024-07-16 ENCOUNTER — PHARMACY VISIT (OUTPATIENT)
Dept: PHARMACY | Facility: CLINIC | Age: 79
End: 2024-07-16
Payer: COMMERCIAL

## 2024-09-25 PROCEDURE — RXMED WILLOW AMBULATORY MEDICATION CHARGE

## 2024-09-26 ENCOUNTER — PHARMACY VISIT (OUTPATIENT)
Dept: PHARMACY | Facility: CLINIC | Age: 79
End: 2024-09-26
Payer: COMMERCIAL

## 2024-10-07 DIAGNOSIS — E78.2 MIXED HYPERLIPIDEMIA: ICD-10-CM

## 2024-10-07 PROCEDURE — RXMED WILLOW AMBULATORY MEDICATION CHARGE

## 2024-10-07 RX ORDER — SIMVASTATIN 20 MG/1
20 TABLET, FILM COATED ORAL DAILY
Qty: 90 TABLET | Refills: 1 | Status: SHIPPED | OUTPATIENT
Start: 2024-10-07

## 2024-10-09 ENCOUNTER — PHARMACY VISIT (OUTPATIENT)
Dept: PHARMACY | Facility: CLINIC | Age: 79
End: 2024-10-09
Payer: COMMERCIAL

## 2024-10-15 ENCOUNTER — TELEMEDICINE (OUTPATIENT)
Dept: PHARMACY | Facility: HOSPITAL | Age: 79
End: 2024-10-15
Payer: MEDICARE

## 2024-10-15 DIAGNOSIS — E78.2 MIXED HYPERLIPIDEMIA: ICD-10-CM

## 2024-10-15 DIAGNOSIS — I48.91 ATRIAL FIBRILLATION, UNSPECIFIED TYPE (MULTI): ICD-10-CM

## 2024-10-15 DIAGNOSIS — I48.91 ATRIAL FIBRILLATION, UNSPECIFIED TYPE (MULTI): Primary | ICD-10-CM

## 2024-10-15 RX ORDER — SIMVASTATIN 20 MG/1
20 TABLET, FILM COATED ORAL DAILY
Qty: 90 TABLET | Refills: 3 | Status: SHIPPED | OUTPATIENT
Start: 2024-10-15

## 2024-10-15 NOTE — PROGRESS NOTES
"Subjective   Patient ID: Diallo Sampson is a 79 y.o. male who presents for Hyperlipidemia and Atrial Fibrillation.    Referring Provider: Suzie Green DO     Mr. Sampson returns to the pharmacy team for difficulty with access to his simvastatin and Eliquis. He is aware of the Medicare coverage gap. He is looking for financial relief for Eliquis as well. Most recent lipid panel done in June shows cholesterol is well controlled with simvastatin 20 mg daily. He has no side effects with therapy. Additionally, Eliquis is dosed appropriately at 5 mg BID based on age, Scr, and weight. No other concerns at this time.     Objective         9/30/2022    11:36 AM 3/31/2023    10:05 AM 7/7/2023     8:46 AM 10/9/2023     3:42 PM 11/29/2023     2:13 PM 2/21/2024    10:09 AM 7/1/2024     9:57 AM   Vitals   Systolic 132 120 120 128 110 100 114   Diastolic 72 78 64 66 67 54 66   Heart Rate 72 69 88 82 70 64 60   Temp  36.2 °C (97.2 °F) 36.3 °C (97.3 °F)  36.4 °C (97.6 °F) 36.7 °C (98.1 °F)    Resp   16  16     Height (in)  1.803 m (5' 11\") 1.803 m (5' 11\") 1.829 m (6') 1.829 m (6')  1.816 m (5' 11.5\")   Weight (lb) 254.4 253 247 255.4 256 256 258.4   BMI 35.48 kg/m2 35.29 kg/m2 34.45 kg/m2 34.64 kg/m2 34.72 kg/m2 34.72 kg/m2 35.54 kg/m2   BSA (m2) 2.4 m2 2.4 m2 2.37 m2 2.43 m2 2.43 m2 2.43 m2 2.43 m2   Visit Report  Report Report Report Report Report Report         Labs  Lab Results   Component Value Date    BILITOT 0.5 06/19/2024    CALCIUM 9.7 06/19/2024    CO2 30 06/19/2024     06/19/2024    CREATININE 1.21 06/19/2024    GLUCOSE 121 (H) 06/19/2024    ALKPHOS 47 06/19/2024    K 4.0 06/19/2024    PROT 6.8 06/19/2024     06/19/2024    AST 20 06/19/2024    ALT 19 06/19/2024    BUN 18 06/19/2024    ANIONGAP 12 06/19/2024    ALBUMIN 4.1 06/19/2024    GFRMALE 63 02/21/2023     Lab Results   Component Value Date    TRIG 186 (H) 06/19/2024    CHOL 140 06/19/2024    LDLCALC 65 06/19/2024    HDL 38.0 06/19/2024     Lab Results "   Component Value Date    HGBA1C 6.5 (H) 06/19/2024       Current Outpatient Medications on File Prior to Visit   Medication Sig Dispense Refill    acetaminophen (Tylenol) 500 mg tablet Take 2 tablets (1,000 mg) by mouth every 8 hours if needed.      apixaban (Eliquis) 5 mg tablet Take 1 tablet (5 mg) by mouth 2 times a day. 180 tablet 1    aspirin 81 mg EC tablet Take 1 tablet (81 mg) by mouth once daily.      fluorouracil (Efudex) 5 % cream       furosemide (Lasix) 20 mg tablet Take 1 tablet (20 mg) by mouth once daily. 30 tablet 1    hydroCHLOROthiazide (HYDRODiuril) 25 mg tablet TAKE 1 TABLET BY MOUTH DAILY 90 tablet 3    losartan (Cozaar) 100 mg tablet TAKE 1 TABLET BY MOUTH DAILY 90 tablet 3    magnesium glycinate 100 mg tablet Take 500 mg by mouth once daily.      meclizine (Antivert) 25 mg tablet Take 1 tablet (25 mg) by mouth 3 times a day as needed.      metFORMIN (Glucophage) 500 mg tablet TAKE 2 TABLETS BY MOUTH TWICE  DAILY 360 tablet 3    mometasone (Elocon) 0.1 % ointment Apply to eyelid at bedtime. 15 g 0    multivitamin capsule Take 1 capsule by mouth once daily.      multivitamin with minerals capsule Take by mouth once daily.      OneTouch Ultra Test strip TEST BLOOD SUGAR ONCE DAILY 100 strip 0    primidone (Mysoline) 50 mg tablet Take 1 tablet (50 mg) by mouth 3 times a day.      simvastatin (Zocor) 20 mg tablet Take 1 tablet (20 mg) by mouth once daily. 90 tablet 1     No current facility-administered medications on file prior to visit.        Assessment/Plan   Problem List Items Addressed This Visit    None  Visit Diagnoses         Codes    Atrial fibrillation, unspecified type (Multi)     I48.91        [facility]   Continue all other meds at this time including Eliquis 5 mg BID  Follow up: as needed    Junior Easton, WonD

## 2024-12-08 DIAGNOSIS — I48.91 ATRIAL FIBRILLATION, UNSPECIFIED TYPE (MULTI): ICD-10-CM

## 2024-12-08 DIAGNOSIS — E78.2 MIXED HYPERLIPIDEMIA: ICD-10-CM

## 2024-12-08 DIAGNOSIS — I10 PRIMARY HYPERTENSION: ICD-10-CM

## 2024-12-10 RX ORDER — SIMVASTATIN 20 MG/1
20 TABLET, FILM COATED ORAL DAILY
Qty: 90 TABLET | Refills: 3 | Status: SHIPPED | OUTPATIENT
Start: 2024-12-10

## 2024-12-10 RX ORDER — APIXABAN 5 MG/1
5 TABLET, FILM COATED ORAL 2 TIMES DAILY
Qty: 180 TABLET | Refills: 3 | Status: SHIPPED | OUTPATIENT
Start: 2024-12-10

## 2024-12-10 RX ORDER — HYDROCHLOROTHIAZIDE 25 MG/1
25 TABLET ORAL DAILY
Qty: 90 TABLET | Refills: 3 | Status: SHIPPED | OUTPATIENT
Start: 2024-12-10

## 2024-12-10 RX ORDER — LOSARTAN POTASSIUM 100 MG/1
100 TABLET ORAL DAILY
Qty: 90 TABLET | Refills: 3 | Status: SHIPPED | OUTPATIENT
Start: 2024-12-10

## 2024-12-17 DIAGNOSIS — I48.91 ATRIAL FIBRILLATION, UNSPECIFIED TYPE (MULTI): ICD-10-CM

## 2025-02-18 ENCOUNTER — OFFICE VISIT (OUTPATIENT)
Dept: PRIMARY CARE | Facility: CLINIC | Age: 80
End: 2025-02-18
Payer: MEDICARE

## 2025-02-18 VITALS
TEMPERATURE: 98.4 F | DIASTOLIC BLOOD PRESSURE: 68 MMHG | WEIGHT: 257.4 LBS | BODY MASS INDEX: 35.4 KG/M2 | OXYGEN SATURATION: 95 % | HEART RATE: 75 BPM | SYSTOLIC BLOOD PRESSURE: 102 MMHG

## 2025-02-18 DIAGNOSIS — J20.9 ACUTE BRONCHITIS, UNSPECIFIED ORGANISM: Primary | ICD-10-CM

## 2025-02-18 DIAGNOSIS — G47.00 INSOMNIA, UNSPECIFIED TYPE: ICD-10-CM

## 2025-02-18 LAB
POC RAPID INFLUENZA A: NEGATIVE
POC RAPID INFLUENZA B: NEGATIVE

## 2025-02-18 PROCEDURE — 99214 OFFICE O/P EST MOD 30 MIN: CPT | Performed by: FAMILY MEDICINE

## 2025-02-18 PROCEDURE — 1123F ACP DISCUSS/DSCN MKR DOCD: CPT | Performed by: FAMILY MEDICINE

## 2025-02-18 PROCEDURE — 1159F MED LIST DOCD IN RCRD: CPT | Performed by: FAMILY MEDICINE

## 2025-02-18 PROCEDURE — 3074F SYST BP LT 130 MM HG: CPT | Performed by: FAMILY MEDICINE

## 2025-02-18 PROCEDURE — 3078F DIAST BP <80 MM HG: CPT | Performed by: FAMILY MEDICINE

## 2025-02-18 PROCEDURE — 87804 INFLUENZA ASSAY W/OPTIC: CPT | Performed by: FAMILY MEDICINE

## 2025-02-18 PROCEDURE — 1036F TOBACCO NON-USER: CPT | Performed by: FAMILY MEDICINE

## 2025-02-18 PROCEDURE — 1160F RVW MEDS BY RX/DR IN RCRD: CPT | Performed by: FAMILY MEDICINE

## 2025-02-18 PROCEDURE — 1157F ADVNC CARE PLAN IN RCRD: CPT | Performed by: FAMILY MEDICINE

## 2025-02-18 RX ORDER — DOXYCYCLINE 100 MG/1
100 CAPSULE ORAL 2 TIMES DAILY
Qty: 20 CAPSULE | Refills: 0 | Status: SHIPPED | OUTPATIENT
Start: 2025-02-18 | End: 2025-02-28

## 2025-02-18 RX ORDER — TRAZODONE HYDROCHLORIDE 50 MG/1
25 TABLET ORAL NIGHTLY PRN
Qty: 90 TABLET | Refills: 3 | Status: SHIPPED | OUTPATIENT
Start: 2025-02-18 | End: 2026-02-18

## 2025-02-18 ASSESSMENT — ENCOUNTER SYMPTOMS
COUGH: 1
RHINORRHEA: 1
SORE THROAT: 1
PALPITATIONS: 0
ACTIVITY CHANGE: 0
CONFUSION: 0
CHILLS: 0
CONSTIPATION: 0
APPETITE CHANGE: 0
EYE DISCHARGE: 0
WHEEZING: 0
COLOR CHANGE: 0
FACIAL SWELLING: 0
DIARRHEA: 0
ARTHRALGIAS: 0
FEVER: 0

## 2025-02-18 NOTE — PROGRESS NOTES
"Subjective   Patient ID: Av Sampson \"Ed\" is a 79 y.o. male who presents for Cough (With nasal drainage, sore throat, body aches, headache. X Yesterday.).    Cough  Associated symptoms include rhinorrhea and a sore throat. Pertinent negatives include no chest pain, chills, ear pain, fever or wheezing.   Patient reports he has had nasal drainage cough sore throat body aches and headache since yesterday./Denies any chest pain syncopal episodes palpitations.    Review of Systems   Constitutional:  Negative for activity change, appetite change, chills and fever.   HENT:  Positive for rhinorrhea and sore throat. Negative for congestion, ear pain and facial swelling.    Eyes:  Negative for discharge.   Respiratory:  Positive for cough. Negative for wheezing.    Cardiovascular:  Negative for chest pain, palpitations and leg swelling.   Gastrointestinal:  Negative for constipation and diarrhea.   Musculoskeletal:  Negative for arthralgias.   Skin:  Negative for color change and pallor.   Neurological:  Negative for syncope.   Psychiatric/Behavioral:  Negative for confusion.        Objective   /68 (BP Location: Right arm, Patient Position: Sitting)   Pulse 75   Temp 36.9 °C (98.4 °F)   Wt 117 kg (257 lb 6.4 oz)   SpO2 95%   BMI 35.40 kg/m²   BSA Body surface area is 2.43 meters squared.      Physical Exam  Constitutional:       General: He is not in acute distress.     Appearance: Normal appearance. He is not toxic-appearing.   HENT:      Head: Normocephalic.      Right Ear: Tympanic membrane, ear canal and external ear normal.      Left Ear: Tympanic membrane, ear canal and external ear normal.   Eyes:      Conjunctiva/sclera: Conjunctivae normal.      Pupils: Pupils are equal, round, and reactive to light.   Cardiovascular:      Rate and Rhythm: Normal rate and regular rhythm.      Pulses: Normal pulses.      Heart sounds: Normal heart sounds.   Pulmonary:      Effort: No respiratory distress.      Breath " sounds: No wheezing, rhonchi or rales.   Musculoskeletal:         General: No swelling or tenderness.   Skin:     Findings: No lesion or rash.   Neurological:      General: No focal deficit present.      Mental Status: He is alert and oriented to person, place, and time. Mental status is at baseline.      Gait: Gait normal.   Psychiatric:         Mood and Affect: Mood normal.         Behavior: Behavior normal.         Thought Content: Thought content normal.         Judgment: Judgment normal.     Lab on 06/19/2024   Component Date Value Ref Range Status   • Prostate Specific Antigen,Screen 06/19/2024 2.98  <=4.00 ng/mL Final   • Thyroid Stimulating Hormone 06/19/2024 3.82  0.44 - 3.98 mIU/L Final   • Cholesterol 06/19/2024 140  0 - 199 mg/dL Final          Age      Desirable   Borderline High   High     0-19 Y     0 - 169       170 - 199     >/= 200    20-24 Y     0 - 189       190 - 224     >/= 225         >24 Y     0 - 199       200 - 239     >/= 240   **All ranges are based on fasting samples. Specific   therapeutic targets will vary based on patient-specific   cardiac risk.    Pediatric guidelines reference:Pediatrics 2011, 128(S5).Adult guidelines reference: NCEP ATPIII Guidelines,KEESHA 2001, 258:2486-97    Venipuncture immediately after or during the administration of Metamizole may lead to falsely low results. Testing should be performed immediately prior to Metamizole dosing.   • HDL-Cholesterol 06/19/2024 38.0  mg/dL Final      Age       Very Low   Low     Normal    High    0-19 Y    < 35      < 40     40-45     ----  20-24 Y    ----     < 40      >45      ----        >24 Y      ----     < 40     40-60      >60     • Cholesterol/HDL Ratio 06/19/2024 3.7   Final      Ref Values  Desirable  < 3.4  High Risk  > 5.0   • LDL Calculated 06/19/2024 65  <=99 mg/dL Final                                Near   Borderline      AGE      Desirable  Optimal    High     High     Very High     0-19 Y     0 - 109     ---     110-129   >/= 130     ----    20-24 Y     0 - 119     ---    120-159   >/= 160     ----      >24 Y     0 -  99   100-129  130-159   160-189     >/=190     • VLDL 06/19/2024 37  0 - 40 mg/dL Final   • Triglycerides 06/19/2024 186 (H)  0 - 149 mg/dL Final       Age         Desirable   Borderline High   High     Very High   0 D-90 D    19 - 174         ----         ----        ----  91 D- 9 Y     0 -  74        75 -  99     >/= 100      ----    10-19 Y     0 -  89        90 - 129     >/= 130      ----    20-24 Y     0 - 114       115 - 149     >/= 150      ----         >24 Y     0 - 149       150 - 199    200- 499    >/= 500    Venipuncture immediately after or during the administration of Metamizole may lead to falsely low results. Testing should be performed immediately prior to Metamizole dosing.   • Non HDL Cholesterol 06/19/2024 102  0 - 149 mg/dL Final          Age       Desirable   Borderline High   High     Very High     0-19 Y     0 - 119       120 - 144     >/= 145    >/= 160    20-24 Y     0 - 149       150 - 189     >/= 190      ----         >24 Y    30 mg/dL above LDL Cholesterol goal     • Glucose 06/19/2024 121 (H)  74 - 99 mg/dL Final   • Sodium 06/19/2024 138  136 - 145 mmol/L Final   • Potassium 06/19/2024 4.0  3.5 - 5.3 mmol/L Final   • Chloride 06/19/2024 100  98 - 107 mmol/L Final   • Bicarbonate 06/19/2024 30  21 - 32 mmol/L Final   • Anion Gap 06/19/2024 12  10 - 20 mmol/L Final   • Urea Nitrogen 06/19/2024 18  6 - 23 mg/dL Final   • Creatinine 06/19/2024 1.21  0.50 - 1.30 mg/dL Final   • eGFR 06/19/2024 61  >60 mL/min/1.73m*2 Final    Calculations of estimated GFR are performed using the 2021 CKD-EPI Study Refit equation without the race variable for the IDMS-Traceable creatinine methods.  https://jasn.asnjournals.org/content/early/2021/09/22/ASN.8621641137   • Calcium 06/19/2024 9.7  8.6 - 10.6 mg/dL Final   • Albumin 06/19/2024 4.1  3.4 - 5.0 g/dL Final   • Alkaline Phosphatase 06/19/2024 47   33 - 136 U/L Final   • Total Protein 06/19/2024 6.8  6.4 - 8.2 g/dL Final   • AST 06/19/2024 20  9 - 39 U/L Final   • Bilirubin, Total 06/19/2024 0.5  0.0 - 1.2 mg/dL Final   • ALT 06/19/2024 19  10 - 52 U/L Final    Patients treated with Sulfasalazine may generate falsely decreased results for ALT.   • WBC 06/19/2024 7.0  4.4 - 11.3 x10*3/uL Final   • nRBC 06/19/2024 0.0  0.0 - 0.0 /100 WBCs Final   • RBC 06/19/2024 4.89  4.50 - 5.90 x10*6/uL Final   • Hemoglobin 06/19/2024 15.1  13.5 - 17.5 g/dL Final   • Hematocrit 06/19/2024 46.2  41.0 - 52.0 % Final   • MCV 06/19/2024 95  80 - 100 fL Final   • MCH 06/19/2024 30.9  26.0 - 34.0 pg Final   • MCHC 06/19/2024 32.7  32.0 - 36.0 g/dL Final   • RDW 06/19/2024 14.9 (H)  11.5 - 14.5 % Final   • Platelets 06/19/2024 228  150 - 450 x10*3/uL Final   • Neutrophils % 06/19/2024 46.9  40.0 - 80.0 % Final   • Immature Granulocytes %, Automated 06/19/2024 0.4  0.0 - 0.9 % Final    Immature Granulocyte Count (IG) includes promyelocytes, myelocytes and metamyelocytes but does not include bands. Percent differential counts (%) should be interpreted in the context of the absolute cell counts (cells/UL).   • Lymphocytes % 06/19/2024 41.5  13.0 - 44.0 % Final   • Monocytes % 06/19/2024 7.6  2.0 - 10.0 % Final   • Eosinophils % 06/19/2024 3.0  0.0 - 6.0 % Final   • Basophils % 06/19/2024 0.6  0.0 - 2.0 % Final   • Neutrophils Absolute 06/19/2024 3.28  1.60 - 5.50 x10*3/uL Final    Percent differential counts (%) should be interpreted in the context of the absolute cell counts (cells/uL).   • Immature Granulocytes Absolute, Au* 06/19/2024 0.03  0.00 - 0.50 x10*3/uL Final   • Lymphocytes Absolute 06/19/2024 2.90  0.80 - 3.00 x10*3/uL Final   • Monocytes Absolute 06/19/2024 0.53  0.05 - 0.80 x10*3/uL Final   • Eosinophils Absolute 06/19/2024 0.21  0.00 - 0.40 x10*3/uL Final   • Basophils Absolute 06/19/2024 0.04  0.00 - 0.10 x10*3/uL Final   • Hemoglobin A1C 06/19/2024 6.5 (H)  see below  % Final   • Estimated Average Glucose 06/19/2024 140  Not Established mg/dL Final   Lab on 03/11/2024   Component Date Value Ref Range Status   • Vitamin D, 25-Hydroxy, Total 03/11/2024 40  30 - 100 ng/mL Final     Current Outpatient Medications on File Prior to Visit   Medication Sig Dispense Refill   • acetaminophen (Tylenol) 500 mg tablet Take 2 tablets (1,000 mg) by mouth every 8 hours if needed.     • apixaban (Eliquis) 5 mg tablet Take 1 tablet (5 mg) by mouth 2 times a day. 180 tablet 3   • aspirin 81 mg EC tablet Take 1 tablet (81 mg) by mouth once daily.     • fluorouracil (Efudex) 5 % cream      • hydroCHLOROthiazide (HYDRODiuril) 25 mg tablet TAKE 1 TABLET BY MOUTH DAILY 90 tablet 3   • losartan (Cozaar) 100 mg tablet TAKE 1 TABLET BY MOUTH DAILY 90 tablet 3   • magnesium glycinate 100 mg tablet Take 500 mg by mouth once daily.     • meclizine (Antivert) 25 mg tablet Take 1 tablet (25 mg) by mouth 3 times a day as needed.     • metFORMIN (Glucophage) 500 mg tablet TAKE 2 TABLETS BY MOUTH TWICE  DAILY 360 tablet 3   • mometasone (Elocon) 0.1 % ointment Apply to eyelid at bedtime. 15 g 0   • multivitamin capsule Take 1 capsule by mouth once daily.     • multivitamin with minerals capsule Take by mouth once daily.     • OneTouch Ultra Test strip TEST BLOOD SUGAR ONCE DAILY 100 strip 0   • primidone (Mysoline) 50 mg tablet Take 1 tablet (50 mg) by mouth 3 times a day.     • simvastatin (Zocor) 20 mg tablet TAKE 1 TABLET BY MOUTH DAILY 90 tablet 3   • furosemide (Lasix) 20 mg tablet Take 1 tablet (20 mg) by mouth once daily. 30 tablet 1     No current facility-administered medications on file prior to visit.     No images are attached to the encounter.            Assessment/Plan   Diagnoses and all orders for this visit:  Acute bronchitis, unspecified organism  -     POCT Influenza A/B manually resulted  -     doxycycline (Vibramycin) 100 mg capsule; Take 1 capsule (100 mg) by mouth 2 times a day for 10 days.  Take with at least 8 ounces (large glass) of water, do not lie down for 30 minutes after  -     XR chest 2 views; Future  Insomnia, unspecified type  -     traZODone (Desyrel) 50 mg tablet; Take 0.5 tablets (25 mg) by mouth as needed at bedtime for sleep.    Patient to start on doxycycline and have stat CXR  Patient to call if questions or concerns

## 2025-02-19 ENCOUNTER — HOSPITAL ENCOUNTER (OUTPATIENT)
Dept: RADIOLOGY | Facility: CLINIC | Age: 80
Discharge: HOME | End: 2025-02-19
Payer: MEDICARE

## 2025-02-19 DIAGNOSIS — U07.1 COVID-19: Primary | ICD-10-CM

## 2025-02-19 DIAGNOSIS — J20.9 ACUTE BRONCHITIS, UNSPECIFIED ORGANISM: ICD-10-CM

## 2025-02-19 LAB — QUEST FLEXITEST2 RESULTS:: NORMAL

## 2025-02-19 PROCEDURE — 71046 X-RAY EXAM CHEST 2 VIEWS: CPT | Performed by: RADIOLOGY

## 2025-02-19 PROCEDURE — 71046 X-RAY EXAM CHEST 2 VIEWS: CPT

## 2025-02-26 PROCEDURE — RXMED WILLOW AMBULATORY MEDICATION CHARGE

## 2025-02-27 ENCOUNTER — PHARMACY VISIT (OUTPATIENT)
Dept: PHARMACY | Facility: CLINIC | Age: 80
End: 2025-02-27
Payer: COMMERCIAL

## 2025-03-05 ENCOUNTER — TELEPHONE (OUTPATIENT)
Dept: PRIMARY CARE | Facility: CLINIC | Age: 80
End: 2025-03-05
Payer: MEDICARE

## 2025-03-05 NOTE — TELEPHONE ENCOUNTER
Patient is needing a tooth extracted. Patient takes Baby Asprin and Eliquis daily. They are wanting to know when he should stop taking both prior to procedure and when to restart taking it. Please advise. Thank you.     ([facility] [phone])

## 2025-03-07 ENCOUNTER — OFFICE VISIT (OUTPATIENT)
Dept: PRIMARY CARE | Facility: CLINIC | Age: 80
End: 2025-03-07
Payer: MEDICARE

## 2025-03-07 ENCOUNTER — HOSPITAL ENCOUNTER (OUTPATIENT)
Dept: RADIOLOGY | Facility: CLINIC | Age: 80
Discharge: HOME | End: 2025-03-07
Payer: MEDICARE

## 2025-03-07 VITALS
HEART RATE: 91 BPM | WEIGHT: 249.6 LBS | DIASTOLIC BLOOD PRESSURE: 70 MMHG | BODY MASS INDEX: 34.94 KG/M2 | SYSTOLIC BLOOD PRESSURE: 130 MMHG | HEIGHT: 71 IN

## 2025-03-07 DIAGNOSIS — Z01.818 PREOP EXAMINATION: Primary | ICD-10-CM

## 2025-03-07 DIAGNOSIS — I48.91 ATRIAL FIBRILLATION, UNSPECIFIED TYPE (MULTI): ICD-10-CM

## 2025-03-07 DIAGNOSIS — R51.9 INTRACTABLE HEADACHE, UNSPECIFIED CHRONICITY PATTERN, UNSPECIFIED HEADACHE TYPE: ICD-10-CM

## 2025-03-07 PROCEDURE — 1123F ACP DISCUSS/DSCN MKR DOCD: CPT | Performed by: FAMILY MEDICINE

## 2025-03-07 PROCEDURE — 3075F SYST BP GE 130 - 139MM HG: CPT | Performed by: FAMILY MEDICINE

## 2025-03-07 PROCEDURE — 1036F TOBACCO NON-USER: CPT | Performed by: FAMILY MEDICINE

## 2025-03-07 PROCEDURE — 3078F DIAST BP <80 MM HG: CPT | Performed by: FAMILY MEDICINE

## 2025-03-07 PROCEDURE — 1159F MED LIST DOCD IN RCRD: CPT | Performed by: FAMILY MEDICINE

## 2025-03-07 PROCEDURE — 99214 OFFICE O/P EST MOD 30 MIN: CPT | Performed by: FAMILY MEDICINE

## 2025-03-07 PROCEDURE — 70450 CT HEAD/BRAIN W/O DYE: CPT

## 2025-03-07 PROCEDURE — 1157F ADVNC CARE PLAN IN RCRD: CPT | Performed by: FAMILY MEDICINE

## 2025-03-07 ASSESSMENT — ENCOUNTER SYMPTOMS
ARTHRALGIAS: 0
CHILLS: 0
WHEEZING: 0
HEADACHES: 1
COUGH: 0
LOSS OF SENSATION IN FEET: 0
FEVER: 0
DEPRESSION: 0
DIARRHEA: 0
COLOR CHANGE: 0
ACTIVITY CHANGE: 0
CONSTIPATION: 0
EYE DISCHARGE: 0
APPETITE CHANGE: 0
OCCASIONAL FEELINGS OF UNSTEADINESS: 0
FACIAL SWELLING: 0
PALPITATIONS: 0
CONFUSION: 0

## 2025-03-07 ASSESSMENT — PATIENT HEALTH QUESTIONNAIRE - PHQ9
2. FEELING DOWN, DEPRESSED OR HOPELESS: NOT AT ALL
SUM OF ALL RESPONSES TO PHQ9 QUESTIONS 1 AND 2: 0
10. IF YOU CHECKED OFF ANY PROBLEMS, HOW DIFFICULT HAVE THESE PROBLEMS MADE IT FOR YOU TO DO YOUR WORK, TAKE CARE OF THINGS AT HOME, OR GET ALONG WITH OTHER PEOPLE: NOT DIFFICULT AT ALL
1. LITTLE INTEREST OR PLEASURE IN DOING THINGS: NOT AT ALL

## 2025-03-07 NOTE — PROGRESS NOTES
"Subjective   Patient ID: Av Sampson \"Ed\" is a 79 y.o. male who presents for Pre-op Exam (Extraction of tooth, currently on Eliquis. ).    HPI   Reports that he is going to have an extraction of his tooth and is here for a surgical clearance to interrupt his anticoagulant Eliquis used for atrial fibrillation.    Patient reports that he has been having headaches chronic every day and infrequently he is having a stabbing pain over his right eye explains that he is having to have this tooth extracted and wonders if this is what is causing his pain.  He explains that this is not debilitating however he has had not had headache that has woken him from sleep but will report this as the worst headache of his life.  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    Review of Systems   Constitutional:  Negative for activity change, appetite change, chills and fever.   HENT:  Negative for congestion, ear pain and facial swelling.    Eyes:  Negative for discharge.   Respiratory:  Negative for cough and wheezing.    Cardiovascular:  Negative for chest pain, palpitations and leg swelling.   Gastrointestinal:  Negative for constipation and diarrhea.   Musculoskeletal:  Negative for arthralgias.   Skin:  Negative for color change and pallor.   Neurological:  Positive for headaches. Negative for syncope.   Psychiatric/Behavioral:  Negative for confusion.        Objective   /70 (BP Location: Right arm, Patient Position: Sitting)   Pulse 91   Ht 1.803 m (5' 11\")   Wt 113 kg (249 lb 9.6 oz)   BMI 34.81 kg/m²   BSA Body surface area is 2.38 meters squared.      Physical Exam  Constitutional:       General: He is not in acute distress.     Appearance: Normal appearance. He is not toxic-appearing.   HENT:      Head: Normocephalic.      Right Ear: Tympanic membrane, ear canal and external ear " normal.      Left Ear: Tympanic membrane, ear canal and external ear normal.   Eyes:      Conjunctiva/sclera: Conjunctivae normal.      Pupils: Pupils are equal, round, and reactive to light.   Cardiovascular:      Rate and Rhythm: Normal rate and regular rhythm.      Pulses: Normal pulses.      Heart sounds: Normal heart sounds.   Pulmonary:      Effort: No respiratory distress.      Breath sounds: No wheezing, rhonchi or rales.   Abdominal:      General: Bowel sounds are normal. There is no distension.      Palpations: Abdomen is soft.      Tenderness: There is no abdominal tenderness.   Musculoskeletal:         General: No swelling or tenderness.   Skin:     Findings: No lesion or rash.   Neurological:      General: No focal deficit present.      Mental Status: He is alert and oriented to person, place, and time. Mental status is at baseline.      Gait: Gait normal.   Psychiatric:         Mood and Affect: Mood normal.         Behavior: Behavior normal.         Thought Content: Thought content normal.         Judgment: Judgment normal.       Office Visit on 02/18/2025   Component Date Value Ref Range Status    POC Rapid Influenza A 02/18/2025 Negative  Negative Final    POC Rapid Influenza B 02/18/2025 Negative  Negative Final    RESULTS: 02/18/2025 See Note   Final    Comment:     SARS COV2/INFLUENZA A/B AND RSV RNA QL NAAT  TEST NAME               RESULT      FLAG UNITS        REF RANGE  =========               ======      ==== =====        =========     FLUAV   RNA Resp Ql MANJULA+probe  NOT DETECTED                  NOT DETECTED      FLUBV   RNA Resp Ql MANJULA+probe  NOT DETECTED                  NOT DETECTED      RSV   RNA Resp Ql MANJULA+probe  NOT DETECTED                  NOT DETECTED      SARS-CoV-2   RNA Resp Ql MANJULA+probe  DETECTED      A               NOT DETECTED         A Detected result indicates that RNA from SARS-CoV-2,  influenza A, influenza B or RSV was Detected. Clinical  correlation with patient history  and other diagnostic  information is necessary to determine patient infection  status. Positive results do not rule out bacterial  infection or co-infection with other viruses. The agent  detected may not be the definite cause of disease. Test  results will be reported to the appropriate public  health authorities.       Methodology: Reverse transcri                           ption polymerase chain reaction (RT-PCR).     Test Performed at:  [facility]  [address] [place]  [address]     Oz Lopez MD     Lab on 06/19/2024   Component Date Value Ref Range Status    Prostate Specific Antigen,Screen 06/19/2024 2.98  <=4.00 ng/mL Final    Thyroid Stimulating Hormone 06/19/2024 3.82  0.44 - 3.98 mIU/L Final    Cholesterol 06/19/2024 140  0 - 199 mg/dL Final          Age      Desirable   Borderline High   High     0-19 Y     0 - 169       170 - 199     >/= 200    20-24 Y     0 - 189       190 - 224     >/= 225         >24 Y     0 - 199       200 - 239     >/= 240   **All ranges are based on fasting samples. Specific   therapeutic targets will vary based on patient-specific   cardiac risk.    Pediatric guidelines reference:Pediatrics 2011, 128(S5).Adult guidelines reference: NCEP ATPIII Guidelines,KEESHA 2001, 258:2486-97    Venipuncture immediately after or during the administration of Metamizole may lead to falsely low results. Testing should be performed immediately prior to Metamizole dosing.    HDL-Cholesterol 06/19/2024 38.0  mg/dL Final      Age       Very Low   Low     Normal    High    0-19 Y    < 35      < 40     40-45     ----  20-24 Y    ----     < 40      >45      ----        >24 Y      ----     < 40     40-60      >60      Cholesterol/HDL Ratio 06/19/2024 3.7   Final      Ref Values  Desirable  < 3.4  High Risk  > 5.0    LDL Calculated 06/19/2024 65  <=99 mg/dL Final                                Near   Borderline      AGE      Desirable   Optimal    High     High     Very High     0-19 Y     0 - 109     ---    110-129   >/= 130     ----    20-24 Y     0 - 119     ---    120-159   >/= 160     ----      >24 Y     0 -  99   100-129  130-159   160-189     >/=190      VLDL 06/19/2024 37  0 - 40 mg/dL Final    Triglycerides 06/19/2024 186 (H)  0 - 149 mg/dL Final       Age         Desirable   Borderline High   High     Very High   0 D-90 D    19 - 174         ----         ----        ----  91 D- 9 Y     0 -  74        75 -  99     >/= 100      ----    10-19 Y     0 -  89        90 - 129     >/= 130      ----    20-24 Y     0 - 114       115 - 149     >/= 150      ----         >24 Y     0 - 149       150 - 199    200- 499    >/= 500    Venipuncture immediately after or during the administration of Metamizole may lead to falsely low results. Testing should be performed immediately prior to Metamizole dosing.    Non HDL Cholesterol 06/19/2024 102  0 - 149 mg/dL Final          Age       Desirable   Borderline High   High     Very High     0-19 Y     0 - 119       120 - 144     >/= 145    >/= 160    20-24 Y     0 - 149       150 - 189     >/= 190      ----         >24 Y    30 mg/dL above LDL Cholesterol goal      Glucose 06/19/2024 121 (H)  74 - 99 mg/dL Final    Sodium 06/19/2024 138  136 - 145 mmol/L Final    Potassium 06/19/2024 4.0  3.5 - 5.3 mmol/L Final    Chloride 06/19/2024 100  98 - 107 mmol/L Final    Bicarbonate 06/19/2024 30  21 - 32 mmol/L Final    Anion Gap 06/19/2024 12  10 - 20 mmol/L Final    Urea Nitrogen 06/19/2024 18  6 - 23 mg/dL Final    Creatinine 06/19/2024 1.21  0.50 - 1.30 mg/dL Final    eGFR 06/19/2024 61  >60 mL/min/1.73m*2 Final    Calculations of estimated GFR are performed using the 2021 CKD-EPI Study Refit equation without the race variable for the IDMS-Traceable creatinine methods.  https://jasn.asnjournals.org/content/early/2021/09/22/ASN.1759667553    Calcium 06/19/2024 9.7  8.6 - 10.6 mg/dL Final    Albumin 06/19/2024 4.1   3.4 - 5.0 g/dL Final    Alkaline Phosphatase 06/19/2024 47  33 - 136 U/L Final    Total Protein 06/19/2024 6.8  6.4 - 8.2 g/dL Final    AST 06/19/2024 20  9 - 39 U/L Final    Bilirubin, Total 06/19/2024 0.5  0.0 - 1.2 mg/dL Final    ALT 06/19/2024 19  10 - 52 U/L Final    Patients treated with Sulfasalazine may generate falsely decreased results for ALT.    WBC 06/19/2024 7.0  4.4 - 11.3 x10*3/uL Final    nRBC 06/19/2024 0.0  0.0 - 0.0 /100 WBCs Final    RBC 06/19/2024 4.89  4.50 - 5.90 x10*6/uL Final    Hemoglobin 06/19/2024 15.1  13.5 - 17.5 g/dL Final    Hematocrit 06/19/2024 46.2  41.0 - 52.0 % Final    MCV 06/19/2024 95  80 - 100 fL Final    MCH 06/19/2024 30.9  26.0 - 34.0 pg Final    MCHC 06/19/2024 32.7  32.0 - 36.0 g/dL Final    RDW 06/19/2024 14.9 (H)  11.5 - 14.5 % Final    Platelets 06/19/2024 228  150 - 450 x10*3/uL Final    Neutrophils % 06/19/2024 46.9  40.0 - 80.0 % Final    Immature Granulocytes %, Automated 06/19/2024 0.4  0.0 - 0.9 % Final    Immature Granulocyte Count (IG) includes promyelocytes, myelocytes and metamyelocytes but does not include bands. Percent differential counts (%) should be interpreted in the context of the absolute cell counts (cells/UL).    Lymphocytes % 06/19/2024 41.5  13.0 - 44.0 % Final    Monocytes % 06/19/2024 7.6  2.0 - 10.0 % Final    Eosinophils % 06/19/2024 3.0  0.0 - 6.0 % Final    Basophils % 06/19/2024 0.6  0.0 - 2.0 % Final    Neutrophils Absolute 06/19/2024 3.28  1.60 - 5.50 x10*3/uL Final    Percent differential counts (%) should be interpreted in the context of the absolute cell counts (cells/uL).    Immature Granulocytes Absolute, Au* 06/19/2024 0.03  0.00 - 0.50 x10*3/uL Final    Lymphocytes Absolute 06/19/2024 2.90  0.80 - 3.00 x10*3/uL Final    Monocytes Absolute 06/19/2024 0.53  0.05 - 0.80 x10*3/uL Final    Eosinophils Absolute 06/19/2024 0.21  0.00 - 0.40 x10*3/uL Final    Basophils Absolute 06/19/2024 0.04  0.00 - 0.10 x10*3/uL Final    Hemoglobin  A1C 06/19/2024 6.5 (H)  see below % Final    Estimated Average Glucose 06/19/2024 140  Not Established mg/dL Final   Lab on 03/11/2024   Component Date Value Ref Range Status    Vitamin D, 25-Hydroxy, Total 03/11/2024 40  30 - 100 ng/mL Final     Current Outpatient Medications on File Prior to Visit   Medication Sig Dispense Refill    acetaminophen (Tylenol) 500 mg tablet Take 2 tablets (1,000 mg) by mouth every 8 hours if needed.      aspirin 81 mg EC tablet Take 1 tablet (81 mg) by mouth once daily.      fluorouracil (Efudex) 5 % cream       hydroCHLOROthiazide (HYDRODiuril) 25 mg tablet TAKE 1 TABLET BY MOUTH DAILY 90 tablet 3    losartan (Cozaar) 100 mg tablet TAKE 1 TABLET BY MOUTH DAILY 90 tablet 3    magnesium glycinate 100 mg tablet Take 500 mg by mouth once daily.      meclizine (Antivert) 25 mg tablet Take 1 tablet (25 mg) by mouth 3 times a day as needed.      metFORMIN (Glucophage) 500 mg tablet TAKE 2 TABLETS BY MOUTH TWICE  DAILY 360 tablet 3    molnupiravir (Lagevrio) capsule capsule Take 4 capsules (800 mg) by mouth every 12 hours. 40 capsule 0    mometasone (Elocon) 0.1 % ointment Apply to eyelid at bedtime. 15 g 0    multivitamin capsule Take 1 capsule by mouth once daily.      multivitamin with minerals capsule Take by mouth once daily.      OneTouch Ultra Test strip TEST BLOOD SUGAR ONCE DAILY 100 strip 0    primidone (Mysoline) 50 mg tablet Take 1 tablet (50 mg) by mouth 3 times a day.      simvastatin (Zocor) 20 mg tablet TAKE 1 TABLET BY MOUTH DAILY 90 tablet 3    traZODone (Desyrel) 50 mg tablet Take 0.5 tablets (25 mg) by mouth as needed at bedtime for sleep. 90 tablet 3    [DISCONTINUED] apixaban (Eliquis) 5 mg tablet Take 1 tablet (5 mg) by mouth 2 times a day. 180 tablet 3    furosemide (Lasix) 20 mg tablet Take 1 tablet (20 mg) by mouth once daily. 30 tablet 1    [DISCONTINUED] doxycycline (Vibramycin) 100 mg capsule Take 1 capsule (100 mg) by mouth 2 times a day for 10 days. Take with  at least 8 ounces (large glass) of water, do not lie down for 30 minutes after 20 capsule 0     No current facility-administered medications on file prior to visit.     No images are attached to the encounter.            Assessment/Plan   Diagnoses and all orders for this visit:  Preop examination  Atrial fibrillation, unspecified type (Multi)  -     apixaban (Eliquis) 5 mg tablet; Take 1 tablet (5 mg) by mouth 2 times a day.    1.  Patient is able to stop his anticoagulant 3 days prior will resume in 24 hours after procedure  2.  Patient to have stat CT head  3.  Patient to call questions or concerns

## 2025-03-12 DIAGNOSIS — E11.9 TYPE 2 DIABETES MELLITUS WITHOUT COMPLICATION, WITHOUT LONG-TERM CURRENT USE OF INSULIN (MULTI): ICD-10-CM

## 2025-03-12 RX ORDER — METFORMIN HYDROCHLORIDE 500 MG/1
1000 TABLET ORAL 2 TIMES DAILY
Qty: 360 TABLET | Refills: 3 | Status: SHIPPED | OUTPATIENT
Start: 2025-03-12

## 2025-03-31 ENCOUNTER — OFFICE VISIT (OUTPATIENT)
Dept: PRIMARY CARE | Facility: CLINIC | Age: 80
End: 2025-03-31
Payer: MEDICARE

## 2025-03-31 ENCOUNTER — TELEPHONE (OUTPATIENT)
Dept: PRIMARY CARE | Facility: CLINIC | Age: 80
End: 2025-03-31
Payer: MEDICARE

## 2025-03-31 VITALS
DIASTOLIC BLOOD PRESSURE: 68 MMHG | TEMPERATURE: 97.8 F | BODY MASS INDEX: 35.29 KG/M2 | SYSTOLIC BLOOD PRESSURE: 112 MMHG | WEIGHT: 253 LBS | HEART RATE: 82 BPM

## 2025-03-31 DIAGNOSIS — R51.9 NONINTRACTABLE HEADACHE, UNSPECIFIED CHRONICITY PATTERN, UNSPECIFIED HEADACHE TYPE: ICD-10-CM

## 2025-03-31 DIAGNOSIS — G89.18 POST-OP PAIN: Primary | ICD-10-CM

## 2025-03-31 PROCEDURE — 3074F SYST BP LT 130 MM HG: CPT | Performed by: FAMILY MEDICINE

## 2025-03-31 PROCEDURE — 99214 OFFICE O/P EST MOD 30 MIN: CPT | Performed by: FAMILY MEDICINE

## 2025-03-31 PROCEDURE — 1036F TOBACCO NON-USER: CPT | Performed by: FAMILY MEDICINE

## 2025-03-31 PROCEDURE — 3078F DIAST BP <80 MM HG: CPT | Performed by: FAMILY MEDICINE

## 2025-03-31 PROCEDURE — 1160F RVW MEDS BY RX/DR IN RCRD: CPT | Performed by: FAMILY MEDICINE

## 2025-03-31 PROCEDURE — 1123F ACP DISCUSS/DSCN MKR DOCD: CPT | Performed by: FAMILY MEDICINE

## 2025-03-31 PROCEDURE — 1159F MED LIST DOCD IN RCRD: CPT | Performed by: FAMILY MEDICINE

## 2025-03-31 PROCEDURE — 1157F ADVNC CARE PLAN IN RCRD: CPT | Performed by: FAMILY MEDICINE

## 2025-03-31 RX ORDER — TRAMADOL HYDROCHLORIDE 50 MG/1
50 TABLET ORAL EVERY 8 HOURS PRN
Qty: 15 TABLET | Refills: 0 | Status: SHIPPED | OUTPATIENT
Start: 2025-03-31 | End: 2025-04-05

## 2025-03-31 ASSESSMENT — ENCOUNTER SYMPTOMS
CONFUSION: 0
EYE DISCHARGE: 0
CHILLS: 0
ARTHRALGIAS: 0
ACTIVITY CHANGE: 0
WHEEZING: 0
FACIAL SWELLING: 0
COUGH: 0
CONSTIPATION: 0
PALPITATIONS: 0
DIARRHEA: 0
FEVER: 0
COLOR CHANGE: 0
APPETITE CHANGE: 0

## 2025-03-31 NOTE — PROGRESS NOTES
"Subjective   Patient ID: Av Sampson \"Ed\" is a 79 y.o. male who presents for Pain medication .    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  He explains that he has been taking Tylenol regularly and this is not helping his pain.  He has tried calling the doctor who removed the lesion without any response.  He reports he is in 8 out of 10 pain from this procedure.    Review of Systems   Constitutional:  Negative for activity change, appetite change, chills and fever.   HENT:  Negative for congestion, ear pain and facial swelling.    Eyes:  Negative for discharge.   Respiratory:  Negative for cough and wheezing.    Cardiovascular:  Negative for chest pain, palpitations and leg swelling.   Gastrointestinal:  Negative for constipation and diarrhea.   Musculoskeletal:  Negative for arthralgias.   Skin:  Negative for color change and pallor.   Neurological:  Negative for syncope.   Psychiatric/Behavioral:  Negative for confusion.        Objective   /68 (BP Location: Left arm, Patient Position: Sitting)   Pulse 82   Temp 36.6 °C (97.8 °F)   Wt 115 kg (253 lb)   BMI 35.29 kg/m²   BSA Body surface area is 2.4 meters squared.      Physical Exam  Constitutional:       General: He is not in acute distress.     Appearance: Normal appearance. He is not toxic-appearing.   HENT:      Head: Normocephalic.      Right Ear: Tympanic membrane, ear canal and external ear normal.      Left Ear: Tympanic membrane, ear canal and external ear normal.   Eyes:      Conjunctiva/sclera: Conjunctivae normal.      Pupils: Pupils are equal, round, and reactive to light.   Cardiovascular:      Rate and Rhythm: Normal rate and regular rhythm.      Pulses: Normal pulses.      Heart sounds: Normal heart sounds.   Pulmonary:      Effort: No respiratory distress.      Breath sounds: No wheezing, rhonchi or rales.   Abdominal:      General: Bowel sounds are normal. There is no " "distension.      Palpations: Abdomen is soft.      Tenderness: There is no abdominal tenderness.   Musculoskeletal:         General: No swelling or tenderness.   Skin:     Findings: Lesion present. No rash.      Comments: 1.5\" open lesion on top of head   Neurological:      General: No focal deficit present.      Mental Status: He is alert and oriented to person, place, and time. Mental status is at baseline.      Gait: Gait normal.   Psychiatric:         Mood and Affect: Mood normal.         Behavior: Behavior normal.         Thought Content: Thought content normal.         Judgment: Judgment normal.     Office Visit on 02/18/2025   Component Date Value Ref Range Status   • POC Rapid Influenza A 02/18/2025 Negative  Negative Final   • POC Rapid Influenza B 02/18/2025 Negative  Negative Final   • RESULTS: 02/18/2025 See Note   Final    Comment:     SARS COV2/INFLUENZA A/B AND RSV RNA QL NAAT  TEST NAME               RESULT      FLAG UNITS        REF RANGE  =========               ======      ==== =====        =========     FLUAV   RNA Resp Ql MANJULA+probe  NOT DETECTED                  NOT DETECTED      FLUBV   RNA Resp Ql MANJULA+probe  NOT DETECTED                  NOT DETECTED      RSV   RNA Resp Ql MANJULA+probe  NOT DETECTED                  NOT DETECTED      SARS-CoV-2   RNA Resp Ql MANJULA+probe  DETECTED      A               NOT DETECTED         A Detected result indicates that RNA from SARS-CoV-2,  influenza A, influenza B or RSV was Detected. Clinical  correlation with patient history and other diagnostic  information is necessary to determine patient infection  status. Positive results do not rule out bacterial  infection or co-infection with other viruses. The agent  detected may not be the definite cause of disease. Test  results will be reported to the appropriate public  health authorities.       Methodology: Reverse transcri                           ption polymerase chain reaction (RT-PCR).     Test Performed " at:  [facility]  [address], [address] [facility] [place]  [address]     Oz Lopez MD     Lab on 06/19/2024   Component Date Value Ref Range Status   • Prostate Specific Antigen,Screen 06/19/2024 2.98  <=4.00 ng/mL Final   • Thyroid Stimulating Hormone 06/19/2024 3.82  0.44 - 3.98 mIU/L Final   • Cholesterol 06/19/2024 140  0 - 199 mg/dL Final          Age      Desirable   Borderline High   High     0-19 Y     0 - 169       170 - 199     >/= 200    20-24 Y     0 - 189       190 - 224     >/= 225         >24 Y     0 - 199       200 - 239     >/= 240   **All ranges are based on fasting samples. Specific   therapeutic targets will vary based on patient-specific   cardiac risk.    Pediatric guidelines reference:Pediatrics 2011, 128(S5).Adult guidelines reference: NCEP ATPIII Guidelines,KEESHA 2001, 258:2486-97    Venipuncture immediately after or during the administration of Metamizole may lead to falsely low results. Testing should be performed immediately prior to Metamizole dosing.   • HDL-Cholesterol 06/19/2024 38.0  mg/dL Final      Age       Very Low   Low     Normal    High    0-19 Y    < 35      < 40     40-45     ----  20-24 Y    ----     < 40      >45      ----        >24 Y      ----     < 40     40-60      >60     • Cholesterol/HDL Ratio 06/19/2024 3.7   Final      Ref Values  Desirable  < 3.4  High Risk  > 5.0   • LDL Calculated 06/19/2024 65  <=99 mg/dL Final                                Near   Borderline      AGE      Desirable  Optimal    High     High     Very High     0-19 Y     0 - 109     ---    110-129   >/= 130     ----    20-24 Y     0 - 119     ---    120-159   >/= 160     ----      >24 Y     0 -  99   100-129  130-159   160-189     >/=190     • VLDL 06/19/2024 37  0 - 40 mg/dL Final   • Triglycerides 06/19/2024 186 (H)  0 - 149 mg/dL Final       Age         Desirable   Borderline High   High     Very High   0 D-90 D    19 - 174         ----          ----        ----  91 D- 9 Y     0 -  74        75 -  99     >/= 100      ----    10-19 Y     0 -  89        90 - 129     >/= 130      ----    20-24 Y     0 - 114       115 - 149     >/= 150      ----         >24 Y     0 - 149       150 - 199    200- 499    >/= 500    Venipuncture immediately after or during the administration of Metamizole may lead to falsely low results. Testing should be performed immediately prior to Metamizole dosing.   • Non HDL Cholesterol 06/19/2024 102  0 - 149 mg/dL Final          Age       Desirable   Borderline High   High     Very High     0-19 Y     0 - 119       120 - 144     >/= 145    >/= 160    20-24 Y     0 - 149       150 - 189     >/= 190      ----         >24 Y    30 mg/dL above LDL Cholesterol goal     • Glucose 06/19/2024 121 (H)  74 - 99 mg/dL Final   • Sodium 06/19/2024 138  136 - 145 mmol/L Final   • Potassium 06/19/2024 4.0  3.5 - 5.3 mmol/L Final   • Chloride 06/19/2024 100  98 - 107 mmol/L Final   • Bicarbonate 06/19/2024 30  21 - 32 mmol/L Final   • Anion Gap 06/19/2024 12  10 - 20 mmol/L Final   • Urea Nitrogen 06/19/2024 18  6 - 23 mg/dL Final   • Creatinine 06/19/2024 1.21  0.50 - 1.30 mg/dL Final   • eGFR 06/19/2024 61  >60 mL/min/1.73m*2 Final    Calculations of estimated GFR are performed using the 2021 CKD-EPI Study Refit equation without the race variable for the IDMS-Traceable creatinine methods.  https://jasn.asnjournals.org/content/early/2021/09/22/ASN.5788026836   • Calcium 06/19/2024 9.7  8.6 - 10.6 mg/dL Final   • Albumin 06/19/2024 4.1  3.4 - 5.0 g/dL Final   • Alkaline Phosphatase 06/19/2024 47  33 - 136 U/L Final   • Total Protein 06/19/2024 6.8  6.4 - 8.2 g/dL Final   • AST 06/19/2024 20  9 - 39 U/L Final   • Bilirubin, Total 06/19/2024 0.5  0.0 - 1.2 mg/dL Final   • ALT 06/19/2024 19  10 - 52 U/L Final    Patients treated with Sulfasalazine may generate falsely decreased results for ALT.   • WBC 06/19/2024 7.0  4.4 - 11.3 x10*3/uL Final   • nRBC  06/19/2024 0.0  0.0 - 0.0 /100 WBCs Final   • RBC 06/19/2024 4.89  4.50 - 5.90 x10*6/uL Final   • Hemoglobin 06/19/2024 15.1  13.5 - 17.5 g/dL Final   • Hematocrit 06/19/2024 46.2  41.0 - 52.0 % Final   • MCV 06/19/2024 95  80 - 100 fL Final   • MCH 06/19/2024 30.9  26.0 - 34.0 pg Final   • MCHC 06/19/2024 32.7  32.0 - 36.0 g/dL Final   • RDW 06/19/2024 14.9 (H)  11.5 - 14.5 % Final   • Platelets 06/19/2024 228  150 - 450 x10*3/uL Final   • Neutrophils % 06/19/2024 46.9  40.0 - 80.0 % Final   • Immature Granulocytes %, Automated 06/19/2024 0.4  0.0 - 0.9 % Final    Immature Granulocyte Count (IG) includes promyelocytes, myelocytes and metamyelocytes but does not include bands. Percent differential counts (%) should be interpreted in the context of the absolute cell counts (cells/UL).   • Lymphocytes % 06/19/2024 41.5  13.0 - 44.0 % Final   • Monocytes % 06/19/2024 7.6  2.0 - 10.0 % Final   • Eosinophils % 06/19/2024 3.0  0.0 - 6.0 % Final   • Basophils % 06/19/2024 0.6  0.0 - 2.0 % Final   • Neutrophils Absolute 06/19/2024 3.28  1.60 - 5.50 x10*3/uL Final    Percent differential counts (%) should be interpreted in the context of the absolute cell counts (cells/uL).   • Immature Granulocytes Absolute, Au* 06/19/2024 0.03  0.00 - 0.50 x10*3/uL Final   • Lymphocytes Absolute 06/19/2024 2.90  0.80 - 3.00 x10*3/uL Final   • Monocytes Absolute 06/19/2024 0.53  0.05 - 0.80 x10*3/uL Final   • Eosinophils Absolute 06/19/2024 0.21  0.00 - 0.40 x10*3/uL Final   • Basophils Absolute 06/19/2024 0.04  0.00 - 0.10 x10*3/uL Final   • Hemoglobin A1C 06/19/2024 6.5 (H)  see below % Final   • Estimated Average Glucose 06/19/2024 140  Not Established mg/dL Final     Current Outpatient Medications on File Prior to Visit   Medication Sig Dispense Refill   • acetaminophen (Tylenol) 500 mg tablet Take 2 tablets (1,000 mg) by mouth every 8 hours if needed.     • apixaban (Eliquis) 5 mg tablet Take 1 tablet (5 mg) by mouth 2 times a day. 180  tablet 3   • aspirin 81 mg EC tablet Take 1 tablet (81 mg) by mouth once daily.     • fluorouracil (Efudex) 5 % cream      • hydroCHLOROthiazide (HYDRODiuril) 25 mg tablet TAKE 1 TABLET BY MOUTH DAILY 90 tablet 3   • losartan (Cozaar) 100 mg tablet TAKE 1 TABLET BY MOUTH DAILY 90 tablet 3   • magnesium glycinate 100 mg tablet Take 500 mg by mouth once daily.     • meclizine (Antivert) 25 mg tablet Take 1 tablet (25 mg) by mouth 3 times a day as needed.     • metFORMIN (Glucophage) 500 mg tablet TAKE 2 TABLETS BY MOUTH TWICE  DAILY 360 tablet 3   • molnupiravir (Lagevrio) capsule capsule Take 4 capsules (800 mg) by mouth every 12 hours. 40 capsule 0   • mometasone (Elocon) 0.1 % ointment Apply to eyelid at bedtime. 15 g 0   • multivitamin capsule Take 1 capsule by mouth once daily.     • multivitamin with minerals capsule Take by mouth once daily.     • OneTouch Ultra Test strip TEST BLOOD SUGAR ONCE DAILY 100 strip 0   • primidone (Mysoline) 50 mg tablet Take 1 tablet (50 mg) by mouth 3 times a day.     • simvastatin (Zocor) 20 mg tablet TAKE 1 TABLET BY MOUTH DAILY 90 tablet 3   • traZODone (Desyrel) 50 mg tablet Take 0.5 tablets (25 mg) by mouth as needed at bedtime for sleep. 90 tablet 3   • furosemide (Lasix) 20 mg tablet Take 1 tablet (20 mg) by mouth once daily. 30 tablet 1     No current facility-administered medications on file prior to visit.     No images are attached to the encounter.            Assessment/Plan   Diagnoses and all orders for this visit:  Post-op pain  -     traMADol (Ultram) 50 mg tablet; Take 1 tablet (50 mg) by mouth every 8 hours if needed for severe pain (7 - 10) for up to 5 days.  Nonintractable headache, unspecified chronicity pattern, unspecified headache type  -     traMADol (Ultram) 50 mg tablet; Take 1 tablet (50 mg) by mouth every 8 hours if needed for severe pain (7 - 10) for up to 5 days.    Patient to start on tramadol as needed for severe pain  Patient can continue his  Tylenol regularly for pain  If patient worsens would recommend emergency room  Patient to call if questions or concerns

## 2025-06-02 DIAGNOSIS — R79.89 ABNORMAL TSH: ICD-10-CM

## 2025-06-02 DIAGNOSIS — I10 PRIMARY HYPERTENSION: ICD-10-CM

## 2025-06-02 DIAGNOSIS — Z12.5 SCREENING FOR PROSTATE CANCER: ICD-10-CM

## 2025-06-02 DIAGNOSIS — E11.9 TYPE 2 DIABETES MELLITUS WITHOUT COMPLICATION, WITHOUT LONG-TERM CURRENT USE OF INSULIN: ICD-10-CM

## 2025-06-02 DIAGNOSIS — E78.2 MIXED HYPERLIPIDEMIA: ICD-10-CM

## 2025-06-24 LAB
ALBUMIN SERPL-MCNC: 4.3 G/DL (ref 3.6–5.1)
ALP SERPL-CCNC: 49 U/L (ref 35–144)
ALT SERPL-CCNC: 20 U/L (ref 9–46)
ANION GAP SERPL CALCULATED.4IONS-SCNC: 9 MMOL/L (CALC) (ref 7–17)
AST SERPL-CCNC: 18 U/L (ref 10–35)
BASOPHILS # BLD AUTO: 39 CELLS/UL (ref 0–200)
BASOPHILS NFR BLD AUTO: 0.5 %
BILIRUB SERPL-MCNC: 0.4 MG/DL (ref 0.2–1.2)
BUN SERPL-MCNC: 21 MG/DL (ref 7–25)
CALCIUM SERPL-MCNC: 9.5 MG/DL (ref 8.6–10.3)
CHLORIDE SERPL-SCNC: 103 MMOL/L (ref 98–110)
CHOLEST SERPL-MCNC: 161 MG/DL
CHOLEST/HDLC SERPL: 3.9 (CALC)
CO2 SERPL-SCNC: 28 MMOL/L (ref 20–32)
CREAT SERPL-MCNC: 1.18 MG/DL (ref 0.7–1.22)
EGFRCR SERPLBLD CKD-EPI 2021: 62 ML/MIN/1.73M2
EOSINOPHIL # BLD AUTO: 208 CELLS/UL (ref 15–500)
EOSINOPHIL NFR BLD AUTO: 2.7 %
ERYTHROCYTE [DISTWIDTH] IN BLOOD BY AUTOMATED COUNT: 14 % (ref 11–15)
EST. AVERAGE GLUCOSE BLD GHB EST-MCNC: 166 MG/DL
EST. AVERAGE GLUCOSE BLD GHB EST-SCNC: 9.2 MMOL/L
GLUCOSE SERPL-MCNC: 142 MG/DL (ref 65–99)
HBA1C MFR BLD: 7.4 %
HCT VFR BLD AUTO: 48 % (ref 38.5–50)
HDLC SERPL-MCNC: 41 MG/DL
HGB BLD-MCNC: 15.6 G/DL (ref 13.2–17.1)
LDLC SERPL CALC-MCNC: 93 MG/DL (CALC)
LYMPHOCYTES # BLD AUTO: 3280 CELLS/UL (ref 850–3900)
LYMPHOCYTES NFR BLD AUTO: 42.6 %
MCH RBC QN AUTO: 31.5 PG (ref 27–33)
MCHC RBC AUTO-ENTMCNC: 32.5 G/DL (ref 32–36)
MCV RBC AUTO: 97 FL (ref 80–100)
MONOCYTES # BLD AUTO: 601 CELLS/UL (ref 200–950)
MONOCYTES NFR BLD AUTO: 7.8 %
NEUTROPHILS # BLD AUTO: 3573 CELLS/UL (ref 1500–7800)
NEUTROPHILS NFR BLD AUTO: 46.4 %
NONHDLC SERPL-MCNC: 120 MG/DL (CALC)
PLATELET # BLD AUTO: 239 THOUSAND/UL (ref 140–400)
PMV BLD REES-ECKER: 10.4 FL (ref 7.5–12.5)
POTASSIUM SERPL-SCNC: 4 MMOL/L (ref 3.5–5.3)
PROT SERPL-MCNC: 6.9 G/DL (ref 6.1–8.1)
PSA SERPL-MCNC: 2.67 NG/ML
RBC # BLD AUTO: 4.95 MILLION/UL (ref 4.2–5.8)
SODIUM SERPL-SCNC: 140 MMOL/L (ref 135–146)
TRIGL SERPL-MCNC: 178 MG/DL
TSH SERPL-ACNC: 3.67 MIU/L (ref 0.4–4.5)
WBC # BLD AUTO: 7.7 THOUSAND/UL (ref 3.8–10.8)

## 2025-07-02 ENCOUNTER — APPOINTMENT (OUTPATIENT)
Dept: PRIMARY CARE | Facility: CLINIC | Age: 80
End: 2025-07-02
Payer: MEDICARE

## 2025-07-02 ENCOUNTER — DOCUMENTATION (OUTPATIENT)
Age: 80
End: 2025-07-02

## 2025-07-02 VITALS
WEIGHT: 257.4 LBS | HEIGHT: 71 IN | BODY MASS INDEX: 36.03 KG/M2 | SYSTOLIC BLOOD PRESSURE: 120 MMHG | DIASTOLIC BLOOD PRESSURE: 68 MMHG | TEMPERATURE: 98.1 F | HEART RATE: 92 BPM

## 2025-07-02 DIAGNOSIS — E11.51 TYPE 2 DIABETES MELLITUS WITH DIABETIC PERIPHERAL ANGIOPATHY WITHOUT GANGRENE, UNSPECIFIED WHETHER LONG TERM INSULIN USE (MULTI): ICD-10-CM

## 2025-07-02 DIAGNOSIS — E66.01 OBESITY, MORBID (MULTI): ICD-10-CM

## 2025-07-02 DIAGNOSIS — Z00.00 ROUTINE GENERAL MEDICAL EXAMINATION AT HEALTH CARE FACILITY: ICD-10-CM

## 2025-07-02 DIAGNOSIS — K86.9 PANCREATIC LESION (HHS-HCC): ICD-10-CM

## 2025-07-02 DIAGNOSIS — Z71.89 ADVANCE DIRECTIVE DISCUSSED WITH PATIENT: ICD-10-CM

## 2025-07-02 DIAGNOSIS — N40.0 BENIGN PROSTATIC HYPERPLASIA, UNSPECIFIED WHETHER LOWER URINARY TRACT SYMPTOMS PRESENT: ICD-10-CM

## 2025-07-02 DIAGNOSIS — I72.3 ILIAC ARTERY ANEURYSM: ICD-10-CM

## 2025-07-02 DIAGNOSIS — G25.0 BENIGN ESSENTIAL TREMOR: ICD-10-CM

## 2025-07-02 DIAGNOSIS — E11.9 TYPE 2 DIABETES MELLITUS WITHOUT COMPLICATION, WITHOUT LONG-TERM CURRENT USE OF INSULIN: ICD-10-CM

## 2025-07-02 DIAGNOSIS — I50.9 EDEMA DUE TO CONGESTIVE HEART FAILURE: ICD-10-CM

## 2025-07-02 DIAGNOSIS — E78.1 HYPERTRIGLYCERIDEMIA: ICD-10-CM

## 2025-07-02 DIAGNOSIS — G47.33 OSA (OBSTRUCTIVE SLEEP APNEA): ICD-10-CM

## 2025-07-02 DIAGNOSIS — Z00.00 HEALTHCARE MAINTENANCE: Primary | ICD-10-CM

## 2025-07-02 DIAGNOSIS — E78.2 MIXED HYPERLIPIDEMIA: ICD-10-CM

## 2025-07-02 DIAGNOSIS — I10 PRIMARY HYPERTENSION: ICD-10-CM

## 2025-07-02 PROCEDURE — 3074F SYST BP LT 130 MM HG: CPT | Performed by: FAMILY MEDICINE

## 2025-07-02 PROCEDURE — 1160F RVW MEDS BY RX/DR IN RCRD: CPT | Performed by: FAMILY MEDICINE

## 2025-07-02 PROCEDURE — 1159F MED LIST DOCD IN RCRD: CPT | Performed by: FAMILY MEDICINE

## 2025-07-02 PROCEDURE — 1158F ADVNC CARE PLAN TLK DOCD: CPT | Performed by: FAMILY MEDICINE

## 2025-07-02 PROCEDURE — G0439 PPPS, SUBSEQ VISIT: HCPCS | Performed by: FAMILY MEDICINE

## 2025-07-02 PROCEDURE — 3078F DIAST BP <80 MM HG: CPT | Performed by: FAMILY MEDICINE

## 2025-07-02 PROCEDURE — 99214 OFFICE O/P EST MOD 30 MIN: CPT | Performed by: FAMILY MEDICINE

## 2025-07-02 PROCEDURE — 99497 ADVNCD CARE PLAN 30 MIN: CPT | Performed by: FAMILY MEDICINE

## 2025-07-02 PROCEDURE — 1036F TOBACCO NON-USER: CPT | Performed by: FAMILY MEDICINE

## 2025-07-02 PROCEDURE — 1170F FXNL STATUS ASSESSED: CPT | Performed by: FAMILY MEDICINE

## 2025-07-02 ASSESSMENT — ENCOUNTER SYMPTOMS
EYE DISCHARGE: 0
COUGH: 0
CONFUSION: 0
CONSTIPATION: 0
WHEEZING: 0
FACIAL SWELLING: 0
BACK PAIN: 1
ACTIVITY CHANGE: 0
LOSS OF SENSATION IN FEET: 0
ARTHRALGIAS: 1
PALPITATIONS: 0
COLOR CHANGE: 0
DIARRHEA: 0
OCCASIONAL FEELINGS OF UNSTEADINESS: 0
DEPRESSION: 0
ARTHRALGIAS: 0
CHILLS: 0
APPETITE CHANGE: 0
FEVER: 0

## 2025-07-02 ASSESSMENT — ACTIVITIES OF DAILY LIVING (ADL)
DRESSING: INDEPENDENT
TAKING_MEDICATION: INDEPENDENT
DOING_HOUSEWORK: INDEPENDENT
MANAGING_FINANCES: INDEPENDENT
BATHING: INDEPENDENT
GROCERY_SHOPPING: INDEPENDENT

## 2025-07-02 ASSESSMENT — PATIENT HEALTH QUESTIONNAIRE - PHQ9
1. LITTLE INTEREST OR PLEASURE IN DOING THINGS: NOT AT ALL
SUM OF ALL RESPONSES TO PHQ9 QUESTIONS 1 AND 2: 0
2. FEELING DOWN, DEPRESSED OR HOPELESS: NOT AT ALL

## 2025-07-02 NOTE — ACP (ADVANCE CARE PLANNING)
Confirming Previous Code Status:   Advance Care Planning Note     Discussion Date: 07/02/25   Discussion Participants: patient    The patient wishes to discuss Advance Care Planning today and the following is a brief summary of our discussion.     Patient has capacity to make their own medical decisions: Yes  Health Care Agent/Surrogate Decision Maker documented in chart: Yes    Documents on file and valid:  Advance Directive/Living Will: No   Health Care Power of : No  Other:     Communication of Medical Status/Prognosis:   stable    Communication of Treatment Goals/Options:   stable    Treatment Decisions  Goals of Care: comfort is paramount; other goals are not relevant or achievable   DNR  Follow Up Plan  stable  Team Members  stable  Time Statement: Total face to face time spent on advance care planning was 5 minutes with 16 minutes spent in counseling, including the explanation.    Suzie Green DO  7/2/2025 1:30 PM

## 2025-07-02 NOTE — PROGRESS NOTES
"Patient: Av Sampson \"Ed\"  : 1945  PCP: Suzie Green DO  MRN: [id number]  Program: No linked episodes     Av Sampson \"Ed\" is a 80 y.o. male presenting today for follow-up after being discharged from the hospital 1 days ago. The main problem requiring admission was kidney stone. The discharge summary and/or Transitional Care Management documentation was reviewed. Medication reconciliation was performed as indicated via the \"Justin as Reviewed\" timestamp.     Av Sampson \"Ed\" was contacted by Transitional Care Management services two days after his discharge. This encounter and supporting documentation was reviewed.    The complexity of medical decision making for this patient's transitional care is moderate.        Review of Systems   Constitutional:  Negative for activity change, appetite change, chills and fever.   HENT:  Negative for congestion, ear pain and facial swelling.    Eyes:  Negative for discharge.   Respiratory:  Negative for cough and wheezing.    Cardiovascular:  Negative for chest pain, palpitations and leg swelling.   Gastrointestinal:  Negative for constipation and diarrhea.   Musculoskeletal:  Positive for arthralgias and back pain.   Skin:  Negative for color change and pallor.   Neurological:  Negative for syncope.   Psychiatric/Behavioral:  Negative for confusion.        Family History[1]    No data recorded    No follow-ups on file.    Objective   /68 (BP Location: Left arm, Patient Position: Sitting)   Pulse 92   Temp 36.7 °C (98.1 °F)   Ht 1.791 m (5' 10.5\")   Wt 117 kg (257 lb 6.4 oz)   BMI 36.41 kg/m²   BSA Body surface area is 2.41 meters squared.    Physical Exam  Constitutional:       General: He is not in acute distress.     Appearance: Normal appearance. He is not toxic-appearing.   HENT:      Head: Normocephalic.      Right Ear: Tympanic membrane, ear canal and external ear normal.      Left Ear: Tympanic membrane, ear canal and external ear normal. "   Eyes:      Conjunctiva/sclera: Conjunctivae normal.      Pupils: Pupils are equal, round, and reactive to light.   Cardiovascular:      Rate and Rhythm: Normal rate and regular rhythm.      Pulses: Normal pulses.      Heart sounds: Normal heart sounds.   Pulmonary:      Effort: No respiratory distress.      Breath sounds: No wheezing, rhonchi or rales.   Abdominal:      General: Abdomen is flat. There is no distension.      Palpations: Abdomen is soft.      Tenderness: There is no abdominal tenderness.   Musculoskeletal:         General: No swelling or tenderness.      Right lower leg: Edema present.      Left lower leg: Edema present.      Comments: +1 pitting edema lower legs   Skin:     Findings: No lesion or rash.   Neurological:      General: No focal deficit present.      Mental Status: He is alert and oriented to person, place, and time. Mental status is at baseline.      Gait: Gait normal.   Psychiatric:         Mood and Affect: Mood normal.         Behavior: Behavior normal.         Thought Content: Thought content normal.         Judgment: Judgment normal.       Orders Only on 06/02/2025   Component Date Value Ref Range Status    PSA, TOTAL 06/23/2025 2.67  < OR = 4.00 ng/mL Final    Comment: The total PSA value from this assay system is   standardized against the WHO standard. The test   result will be approximately 20% lower when compared   to the equimolar-standardized total PSA (Louisa   Jefferson). Comparison of serial PSA results should be   interpreted with this fact in mind.     This test was performed using the Siemens   chemiluminescent method. Values obtained from   different assay methods cannot be used  interchangeably. PSA levels, regardless of  value, should not be interpreted as absolute  evidence of the presence or absence of disease.      TSH W/REFLEX TO FT4 06/23/2025 3.67  0.40 - 4.50 mIU/L Final    CHOLESTEROL, TOTAL 06/23/2025 161  <200 mg/dL Final    HDL CHOLESTEROL 06/23/2025 41  >  OR = 40 mg/dL Final    TRIGLYCERIDES 06/23/2025 178 (H)  <150 mg/dL Final    LDL-CHOLESTEROL 06/23/2025 93  mg/dL (calc) Final    Comment: Reference range: <100     Desirable range <100 mg/dL for primary prevention;    <70 mg/dL for patients with CHD or diabetic patients   with > or = 2 CHD risk factors.     LDL-C is now calculated using the Zaida   calculation, which is a validated novel method providing   better accuracy than the Friedewald equation in the   estimation of LDL-C.   Irineo ANN et al. KEESHA. 2013;310(19): 4631-9916   (http://education.[employer].Polar OLED/faq/XOM544)      CHOL/HDLC RATIO 06/23/2025 3.9  <5.0 (calc) Final    NON HDL CHOLESTEROL 06/23/2025 120  <130 mg/dL (calc) Final    Comment: For patients with diabetes plus 1 major ASCVD risk   factor, treating to a non-HDL-C goal of <100 mg/dL   (LDL-C of <70 mg/dL) is considered a therapeutic   option.      GLUCOSE 06/23/2025 142 (H)  65 - 99 mg/dL Final    Comment:               Fasting reference interval     For someone without known diabetes, a glucose  value >125 mg/dL indicates that they may have  diabetes and this should be confirmed with a  follow-up test.         UREA NITROGEN (BUN) 06/23/2025 21  7 - 25 mg/dL Final    CREATININE 06/23/2025 1.18  0.70 - 1.22 mg/dL Final    EGFR 06/23/2025 62  > OR = 60 mL/min/1.73m2 Final    SODIUM 06/23/2025 140  135 - 146 mmol/L Final    POTASSIUM 06/23/2025 4.0  3.5 - 5.3 mmol/L Final    CHLORIDE 06/23/2025 103  98 - 110 mmol/L Final    CARBON DIOXIDE 06/23/2025 28  20 - 32 mmol/L Final    ELECTROLYTE BALANCE 06/23/2025 9  7 - 17 mmol/L (calc) Final    CALCIUM 06/23/2025 9.5  8.6 - 10.3 mg/dL Final    PROTEIN, TOTAL 06/23/2025 6.9  6.1 - 8.1 g/dL Final    ALBUMIN 06/23/2025 4.3  3.6 - 5.1 g/dL Final    BILIRUBIN, TOTAL 06/23/2025 0.4  0.2 - 1.2 mg/dL Final    ALKALINE PHOSPHATASE 06/23/2025 49  35 - 144 U/L Final    AST 06/23/2025 18  10 - 35 U/L Final    ALT 06/23/2025 20  9 - 46 U/L Final     WHITE BLOOD CELL COUNT 06/23/2025 7.7  3.8 - 10.8 Thousand/uL Final    RED BLOOD CELL COUNT 06/23/2025 4.95  4.20 - 5.80 Million/uL Final    HEMOGLOBIN 06/23/2025 15.6  13.2 - 17.1 g/dL Final    HEMATOCRIT 06/23/2025 48.0  38.5 - 50.0 % Final    MCV 06/23/2025 97.0  80.0 - 100.0 fL Final    MCH 06/23/2025 31.5  27.0 - 33.0 pg Final    MCHC 06/23/2025 32.5  32.0 - 36.0 g/dL Final    Comment: For adults, a slight decrease in the calculated MCHC  value (in the range of 30 to 32 g/dL) is most likely  not clinically significant; however, it should be  interpreted with caution in correlation with other  red cell parameters and the patient's clinical  condition.      RDW 06/23/2025 14.0  11.0 - 15.0 % Final    PLATELET COUNT 06/23/2025 239  140 - 400 Thousand/uL Final    MPV 06/23/2025 10.4  7.5 - 12.5 fL Final    ABSOLUTE NEUTROPHILS 06/23/2025 3,573  1,500 - 7,800 cells/uL Final    ABSOLUTE LYMPHOCYTES 06/23/2025 3,280  850 - 3,900 cells/uL Final    ABSOLUTE MONOCYTES 06/23/2025 601  200 - 950 cells/uL Final    ABSOLUTE EOSINOPHILS 06/23/2025 208  15 - 500 cells/uL Final    ABSOLUTE BASOPHILS 06/23/2025 39  0 - 200 cells/uL Final    NEUTROPHILS 06/23/2025 46.4  % Final    LYMPHOCYTES 06/23/2025 42.6  % Final    MONOCYTES 06/23/2025 7.8  % Final    EOSINOPHILS 06/23/2025 2.7  % Final    BASOPHILS 06/23/2025 0.5  % Final    HEMOGLOBIN A1c 06/23/2025 7.4 (H)  <5.7 % Final    Comment: For someone without known diabetes, a hemoglobin A1c  value of 6.5% or greater indicates that they may have   diabetes and this should be confirmed with a follow-up   test.     For someone with known diabetes, a value <7% indicates   that their diabetes is well controlled and a value   greater than or equal to 7% indicates suboptimal   control. A1c targets should be individualized based on   duration of diabetes, age, comorbid conditions, and   other considerations.     Currently, no consensus exists regarding use of  hemoglobin A1c for  diagnosis of diabetes for children.          eAG (mg/dL) 06/23/2025 166  mg/dL Final    eAG (mmol/L) 06/23/2025 9.2  mmol/L Final   Office Visit on 02/18/2025   Component Date Value Ref Range Status    POC Rapid Influenza A 02/18/2025 Negative  Negative Final    POC Rapid Influenza B 02/18/2025 Negative  Negative Final    RESULTS: 02/18/2025 See Note   Final    Comment:     SARS COV2/INFLUENZA A/B AND RSV RNA QL NAAT  TEST NAME               RESULT      FLAG UNITS        REF RANGE  =========               ======      ==== =====        =========     FLUAV   RNA Resp Ql MANJULA+probe  NOT DETECTED                  NOT DETECTED      FLUBV   RNA Resp Ql MANJULA+probe  NOT DETECTED                  NOT DETECTED      RSV   RNA Resp Ql MANJULA+probe  NOT DETECTED                  NOT DETECTED      SARS-CoV-2   RNA Resp Ql MANJULA+probe  DETECTED      A               NOT DETECTED         A Detected result indicates that RNA from SARS-CoV-2,  influenza A, influenza B or RSV was Detected. Clinical  correlation with patient history and other diagnostic  information is necessary to determine patient infection  status. Positive results do not rule out bacterial  infection or co-infection with other viruses. The agent  detected may not be the definite cause of disease. Test  results will be reported to the appropriate public  health authorities.       Methodology: Reverse transcri                           ption polymerase chain reaction (RT-PCR).     Test Performed at:  [facility] [address], [address] [place] [address]  [id number]     Oz Lopez MD       Medications Ordered Prior to Encounter[2]  No images are attached to the encounter.            Assessment/Plan   Diagnoses and all orders for this visit:  Healthcare maintenance  Mixed hyperlipidemia  Primary hypertension  Hypertriglyceridemia  Iliac artery aneurysm  Type 2 diabetes mellitus without complication, without long-term current use of  insulin  Type 2 diabetes mellitus with diabetic peripheral angiopathy without gangrene, unspecified whether long term insulin use (Multi)  Pancreatic lesion (HHS-HCC)  Benign prostatic hyperplasia, unspecified whether lower urinary tract symptoms present  MARKUS (obstructive sleep apnea)  Benign essential tremor    1.  Patient to follow-up with urology  2.  Patient to call if questions or concerns               [1]   Family History  Problem Relation Name Age of Onset    Heart disease Mother          Passed at age 98    Esophageal cancer Father          Passed at age 88    Ovarian cancer Sister          Passed at age 76   [2]   Current Outpatient Medications on File Prior to Visit   Medication Sig Dispense Refill    acetaminophen (Tylenol) 500 mg tablet Take 2 tablets (1,000 mg) by mouth every 8 hours if needed.      apixaban (Eliquis) 5 mg tablet Take 1 tablet (5 mg) by mouth 2 times a day. 180 tablet 3    aspirin 81 mg EC tablet Take 1 tablet (81 mg) by mouth once daily.      fluorouracil (Efudex) 5 % cream       hydroCHLOROthiazide (HYDRODiuril) 25 mg tablet TAKE 1 TABLET BY MOUTH DAILY 90 tablet 3    losartan (Cozaar) 100 mg tablet TAKE 1 TABLET BY MOUTH DAILY 90 tablet 3    magnesium glycinate 100 mg tablet Take 500 mg by mouth once daily.      meclizine (Antivert) 25 mg tablet Take 1 tablet (25 mg) by mouth 3 times a day as needed.      metFORMIN (Glucophage) 500 mg tablet TAKE 2 TABLETS BY MOUTH TWICE  DAILY 360 tablet 3    mometasone (Elocon) 0.1 % ointment Apply to eyelid at bedtime. 15 g 0    multivitamin capsule Take 1 capsule by mouth once daily.      multivitamin with minerals capsule Take by mouth once daily.      OneTouch Ultra Test strip TEST BLOOD SUGAR ONCE DAILY 100 strip 0    primidone (Mysoline) 50 mg tablet Take 1 tablet (50 mg) by mouth 3 times a day.      simvastatin (Zocor) 20 mg tablet TAKE 1 TABLET BY MOUTH DAILY 90 tablet 3    traZODone (Desyrel) 50 mg tablet Take 0.5 tablets (25 mg) by mouth  as needed at bedtime for sleep. 90 tablet 3    [DISCONTINUED] molnupiravir (Lagevrio) capsule capsule Take 4 capsules (800 mg) by mouth every 12 hours. 40 capsule 0    furosemide (Lasix) 20 mg tablet Take 1 tablet (20 mg) by mouth once daily. 30 tablet 1     Current Facility-Administered Medications on File Prior to Visit   Medication Dose Route Frequency Provider Last Rate Last Admin    [DISCONTINUED] acetaminophen (Tylenol) tablet  650 mg oral q6h PRN External Data Provider Generic        [DISCONTINUED] apixaban (Eliquis) tablet  5 mg oral BID External Data Provider Generic        [DISCONTINUED] aspirin EC tablet  81 mg oral Daily External Data Provider Generic        [DISCONTINUED] bisacodyl (Dulcolax) suppository  10 mg rectal  External Data Provider Generic        [DISCONTINUED] cefTRIAXone (Rocephin) in dextrose (iso) IV  2 g intravenous q24h External Data Provider Generic        [DISCONTINUED] GENERIC EXTERNAL MEDICATION     External Data Provider Generic        [DISCONTINUED] GENERIC EXTERNAL MEDICATION     External Data Provider Generic        [DISCONTINUED] GENERIC EXTERNAL MEDICATION     External Data Provider Generic        [DISCONTINUED] GENERIC EXTERNAL MEDICATION     External Data Provider Generic        [DISCONTINUED] hydroCHLOROthiazide (HYDRODiuril) tablet  25 mg oral Daily External Data Provider Generic        [DISCONTINUED] HYDROmorphone (Dilaudid) injection  0.5 mg intravenous q4h PRN External Data Provider Generic        [DISCONTINUED] insulin lispro injection   subcutaneous  External Data Provider Generic        [DISCONTINUED] lactated Ringer's infusion  100 mL/hr intravenous  External Data Provider Generic        [DISCONTINUED] losartan (Cozaar) tablet  100 mg oral Daily External Data Provider Generic        [DISCONTINUED] magnesium hydroxide (Milk of Magnesia) 400 mg/5 mL suspension  30 mL oral  External Data Provider Generic        [DISCONTINUED] meclizine (Antivert) tablet  25 mg oral q8h  PRN External Data Provider Generic        [DISCONTINUED] metFORMIN XR (Glucophage-XR) 24 hr tablet  1,000 mg oral  External Data Provider Generic        [DISCONTINUED] ondansetron (Zofran) injection  4 mg intravenous q6h PRN External Data Provider Generic        [DISCONTINUED] primidone (Mysoline) tablet  100 mg oral BID External Data Provider Generic        [DISCONTINUED] simvastatin (Zocor) tablet  20 mg oral  External Data Provider Generic        [DISCONTINUED] sodium chloride 0.9% infusion  20 mL intravenous  External Data Provider Generic        [DISCONTINUED] tamsulosin (Flomax) 24 hr capsule  0.4 mg oral  External Data Provider Generic

## 2025-07-02 NOTE — ASSESSMENT & PLAN NOTE
MRI on 9/2023 showed  1.  Stable appearance of small cystic lesions noted in the head and neck   of the pancreas.  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  Recommend continued follow-up with repeat   MRI in one year to continue surveillance   Seeing Dr. Talbot used to see Dr. Weiss at [facility]

## 2025-07-02 NOTE — PROGRESS NOTES
"Subjective   Reason for Visit: Av Sampson is an 80 y.o. male here for a Medicare Wellness visit.               HPI  Patient presents for physical exam.      Fam Hx  Mom (96) , HTN  Dad (88) , HTN     Exercise 1/2 hour walking  ETOH denies  Caffeine 1 soda/day  Tobacco cigars 2 years     Dr. Guerrero, ophthalmology April/May      Colonoscopy Dr. Gonsales 2016 \"does not need again per Dr. Gonsales\"     Patient denies any other complaints.                       Patient Care Team:  Suzie Green DO as PCP - General  Suzie Green DO as PCP - MSSP ACO Attributed Provider     Review of Systems   Constitutional:  Negative for activity change, appetite change, chills and fever.   HENT:  Negative for congestion, ear pain and facial swelling.    Eyes:  Negative for discharge.   Respiratory:  Negative for cough and wheezing.    Cardiovascular:  Negative for chest pain, palpitations and leg swelling.   Gastrointestinal:  Negative for constipation and diarrhea.   Musculoskeletal:  Negative for arthralgias.   Skin:  Negative for color change and pallor.   Neurological:  Negative for syncope.   Psychiatric/Behavioral:  Negative for confusion.        Objective   Vitals:  There were no vitals taken for this visit.      Physical Exam  Constitutional:       General: He is not in acute distress.     Appearance: Normal appearance. He is not toxic-appearing.   HENT:      Head: Normocephalic.      Right Ear: Tympanic membrane, ear canal and external ear normal.      Left Ear: Tympanic membrane, ear canal and external ear normal.      Nose: Nose normal.      Mouth/Throat:      Pharynx: Oropharynx is clear.   Eyes:      Conjunctiva/sclera: Conjunctivae normal.      Pupils: Pupils are equal, round, and reactive to light.   Cardiovascular:      Rate and Rhythm: Normal rate and regular rhythm.      Pulses: Normal pulses.      Heart sounds: Normal heart sounds.   Pulmonary:      Effort: No respiratory distress.      Breath " sounds: No wheezing, rhonchi or rales.   Abdominal:      General: Bowel sounds are normal. There is no distension.      Palpations: Abdomen is soft.      Tenderness: There is no abdominal tenderness.   Musculoskeletal:         General: No swelling or tenderness.      Cervical back: No tenderness.   Skin:     Findings: No lesion or rash.   Neurological:      General: No focal deficit present.      Mental Status: He is alert and oriented to person, place, and time. Mental status is at baseline.      Gait: Gait normal.   Psychiatric:         Mood and Affect: Mood normal.         Behavior: Behavior normal.         Thought Content: Thought content normal.         Judgment: Judgment normal.         Assessment/Plan   Problem List Items Addressed This Visit       Benign enlargement of prostate    Benign essential tremor    Hyperlipidemia    Hypertriglyceridemia    Hypertension    MARKUS (obstructive sleep apnea)    Diabetes mellitus (Multi)    Current Assessment & Plan   Hemoglobin A1c 7.4  Stable         Pancreatic lesion (HHS-HCC)    Current Assessment & Plan   MRI on 9/2023 showed  1.  Stable appearance of small cystic lesions noted in the head and neck   of the pancreas.  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  Recommend continued follow-up with repeat   MRI in one year to continue surveillance   Seeing Dr. Talbot used to see Dr. Weiss at [facility]         Iliac artery aneurysm    Type 2 diabetes mellitus with diabetic peripheral angiopathy without gangrene, unspecified whether long term insulin use (Multi)    Current Assessment & Plan   Hba1c 7.4  stable          Other Visit Diagnoses         Healthcare maintenance    -  Primary            1. Patient's blood work discussed at this office visit  2. Patient's current LDL 93, goal LDL <70  3. , goal TG <150, continue TYPE IV diet and exercise  4. HgbA1c is 7.4, goal <6.5, continue no added sugar diet  5. Creatinine is slightly  "elevated, this is about the same as last year  6. Liver function is back to normal, continue to monitor  7. Patient sees Dr. Guerrero, ophthalmology April/May yearly  8. Colonoscopy Dr. Gonsales 2016 \"does not need again per Dr. Gonsales\"  9. MRI of pancreas sees Dr. Talbot for follow up due for MRI 9-2024  9. Patient to call if questions or concerns          "

## 2025-07-02 NOTE — PROGRESS NOTES
Discharge Facility: [facility]  Discharge Diagnosis: Kidney Stone  Admission Date: 6/30/25  Discharge Date: 7/1/25    PCP Appointment Date: 7/2/25  Specialist Appointment Date: TBD  Hospital Encounter and Summary Linked: Yes Hospital Encounter    See discharge assessment below for further details      This patient has a follow up scheduled with PCP within 2 business days of DC on (7/2/25).   This visit qualifies for TCM outreach.

## 2025-07-02 NOTE — ASSESSMENT & PLAN NOTE
stable   Pt presents to ED complaining of back pain for two weeks secondary to fall. Pt not on thinners denies HS. Ambulatory with steady gait w walker assistance. No obvious deformity or trauma.

## 2025-07-08 ENCOUNTER — PATIENT OUTREACH (OUTPATIENT)
Dept: PRIMARY CARE | Facility: CLINIC | Age: 80
End: 2025-07-08
Payer: MEDICARE

## 2025-07-08 NOTE — PROGRESS NOTES
Confirmation of at least 2 patient identifiers.    Completed telephonic follow-up with patient after recent visit with Suzie Green DO      Spoke to patient during outreach call.    Patient reports feeling: Improved    Patient has questions or concerns about medications: No    Have all prescribed medications been filled? Yes    Patient has necessary resources to manage their care? Yes    Patient has questions or concerns? Yes, he had a question about his upcoming urology appt on Friday. I gave him the phone number I have for the urologist which is Dr. Peters with [facility] [phone]. He said he would call and verify the appointment.    Next care management follow-up approximately within one month.  Care  information provided to patient.

## 2025-08-01 ENCOUNTER — APPOINTMENT (OUTPATIENT)
Dept: PHARMACY | Facility: HOSPITAL | Age: 80
End: 2025-08-01
Payer: MEDICARE

## 2025-08-01 DIAGNOSIS — E11.9 TYPE 2 DIABETES MELLITUS WITHOUT COMPLICATION, WITHOUT LONG-TERM CURRENT USE OF INSULIN: Primary | ICD-10-CM

## 2025-08-01 PROCEDURE — RXMED WILLOW AMBULATORY MEDICATION CHARGE

## 2025-08-01 RX ORDER — SEMAGLUTIDE 0.68 MG/ML
0.25 INJECTION, SOLUTION SUBCUTANEOUS WEEKLY
Qty: 3 ML | Refills: 1 | Status: SHIPPED | OUTPATIENT
Start: 2025-08-01

## 2025-08-01 NOTE — PROGRESS NOTES
Patient is sent at the request of Suzie Green DO for my opinion regarding Type 2 diabetes.  My final recommendations will be communicated back to the requesting provider by way of shared medical record.    Subjective     HPI    Past Medical History:  He has a past medical history of Blood in urine (02/14/2023), Cellulitis of unspecified part of limb (10/02/2019), Diabetes mellitus (Multi), Elevated TSH (02/14/2023), and Hypertension.    Past Surgical History:  He has a past surgical history that includes Appendectomy (03/11/2014); Tonsillectomy (03/11/2014); Rotator cuff repair (03/11/2014); Cataract extraction (03/11/2014); Shoulder surgery (03/11/2014); Other surgical history (03/11/2014); Total knee arthroplasty (06/27/2018); and Lithotripsy (07/01/2025).    Social History:  He reports that he has never smoked. He has never been exposed to tobacco smoke. He has never used smokeless tobacco. He reports that he does not drink alcohol and does not use drugs.    Family History:  Family History[1]    Allergies:  Patient has no known allergies.    DIABETES MELLITUS TYPE 2:      Current diabetic medications include:  Metformin 1g BIDAC    Past diabetic medications include:  N/a    Clarifications to above regimen: n/a  Adverse Effects: n/a    Glucose Readings:  Glucometer/CGM Type: glucometer  Patient tests BG 1 times per week    Current home BG readings (mg/dL): -140   Previous home BG readings (mg/dL): n/a    Any episodes of hypoglycemia? No,  .    Did patient treat episode of hypoglycemia appropriately? N/A  Does the patient have a prescription for ready-to-use Glucagon? N/a    Primary Prevention:   Statin? Yes  ACE-I/ARB? Yes  Aspirin? Yes    Pertinent PMH Review:  PMH of Pancreatitis: No  PMH of Retinopathy: No  PMH of Urinary Tract Infections: No  PMH of MTC: No  PMH of MEN2: No  UACR/EGFR in last year?: No, however GFR is 33 per 7/1/25 labs, likely due to procedure    Immunizations:  Influenza?  "Yes  COVID? Yes  Pneumonia? Yes  Shingles? Yes  Hepatitis B? No    Drug Interactions:  None requiring intervention    Objective     Last Recorded Vitals:  BP Readings from Last 6 Encounters:   07/02/25 120/68   03/31/25 112/68   03/07/25 130/70   02/18/25 102/68   07/01/24 114/66   02/21/24 100/54        Wt Readings from Last 6 Encounters:   07/02/25 117 kg (257 lb 6.4 oz)   03/31/25 115 kg (253 lb)   03/07/25 113 kg (249 lb 9.6 oz)   02/18/25 117 kg (257 lb 6.4 oz)   07/01/24 117 kg (258 lb 6.4 oz)   02/21/24 116 kg (256 lb)       BMI Readings from Last 6 Encounters:   07/02/25 36.41 kg/m²   03/31/25 35.29 kg/m²   03/07/25 34.81 kg/m²   02/18/25 35.40 kg/m²   07/01/24 35.54 kg/m²   02/21/24 34.72 kg/m²       Lab Review  Lab Results   Component Value Date    BILITOT 0.4 06/23/2025    CALCIUM 9.5 06/23/2025    CO2 28 06/23/2025     06/23/2025    CREATININE 1.18 06/23/2025    GLUCOSE 142 (H) 06/23/2025    ALKPHOS 49 06/23/2025    K 4.0 06/23/2025    PROT 6.9 06/23/2025     06/23/2025    AST 18 06/23/2025    ALT 20 06/23/2025    BUN 21 06/23/2025    ANIONGAP 9 06/23/2025    ALBUMIN 4.3 06/23/2025    GFRMALE 63 02/21/2023     Lab Results   Component Value Date    TRIG 178 (H) 06/23/2025    CHOL 161 06/23/2025    LDLCALC 93 06/23/2025    HDL 41 06/23/2025     Lab Results   Component Value Date    HGBA1C 7.4 (H) 06/23/2025    HGBA1C 6.5 (H) 06/19/2024    HGBA1C 6.6 (H) 01/01/2024     No results found for: \"MICROALBCREA\"  The ASCVD Risk score (Karan DK, et al., 2019) failed to calculate for the following reasons:    The 2019 ASCVD risk score is only valid for ages 40 to 79      Assessment/Plan   Problem List Items Addressed This Visit       Diabetes mellitus (Multi) - Primary    Relevant Medications    semaglutide (Ozempic) 0.25 mg or 0.5 mg (2 mg/3 mL) pen injector    Other Relevant Orders    Basic metabolic panel    Referral to Clinical Pharmacy     Patients diabetes is reasonably well controlled and needs " improvement with most recent A1c of 7.4% (Goal < 7%).   PA for Ozempic 0.25 mg weekly  May change metformin to 1 tab BID if recheck GFR is still low; continue for now  Compliance at present is estimated to be good. Efforts to improve compliance (if necessary) will be directed at PA and cost.  Education Provided to Patient: Hypoglycemia, side effects and admin   Follow-up: I recommend diabetes care be 1 month.  9/5/25 @ 0911  PCP Follow-Up: 8/4/25    Michael Edwards RPh      Continue all meds under the continuation of care with the referring provider and clinical pharmacy team.         [1]   Family History  Problem Relation Name Age of Onset    Heart disease Mother          Passed at age 98    Esophageal cancer Father          Passed at age 88    Ovarian cancer Sister          Passed at age 76

## 2025-08-04 ENCOUNTER — APPOINTMENT (OUTPATIENT)
Dept: PRIMARY CARE | Facility: CLINIC | Age: 80
End: 2025-08-04
Payer: MEDICARE

## 2025-08-06 ENCOUNTER — PHARMACY VISIT (OUTPATIENT)
Dept: PHARMACY | Facility: CLINIC | Age: 80
End: 2025-08-06
Payer: COMMERCIAL

## 2025-08-06 LAB
ANION GAP SERPL CALCULATED.4IONS-SCNC: 11 MMOL/L (CALC) (ref 7–17)
BUN SERPL-MCNC: 20 MG/DL (ref 7–25)
BUN/CREAT SERPL: ABNORMAL (CALC) (ref 6–22)
CALCIUM SERPL-MCNC: 9.2 MG/DL (ref 8.6–10.3)
CHLORIDE SERPL-SCNC: 102 MMOL/L (ref 98–110)
CO2 SERPL-SCNC: 27 MMOL/L (ref 20–32)
CREAT SERPL-MCNC: 1.17 MG/DL (ref 0.7–1.22)
EGFRCR SERPLBLD CKD-EPI 2021: 63 ML/MIN/1.73M2
GLUCOSE SERPL-MCNC: 122 MG/DL (ref 65–99)
POTASSIUM SERPL-SCNC: 3.8 MMOL/L (ref 3.5–5.3)
SODIUM SERPL-SCNC: 140 MMOL/L (ref 135–146)

## 2025-08-17 DIAGNOSIS — E11.9 TYPE 2 DIABETES MELLITUS WITHOUT COMPLICATION, WITHOUT LONG-TERM CURRENT USE OF INSULIN: ICD-10-CM

## 2025-08-18 RX ORDER — BLOOD SUGAR DIAGNOSTIC
STRIP MISCELLANEOUS
Qty: 100 STRIP | Refills: 0 | Status: SHIPPED | OUTPATIENT
Start: 2025-08-18

## 2025-08-22 ENCOUNTER — PATIENT OUTREACH (OUTPATIENT)
Dept: PRIMARY CARE | Facility: CLINIC | Age: 80
End: 2025-08-22
Payer: MEDICARE

## 2025-09-05 ENCOUNTER — APPOINTMENT (OUTPATIENT)
Dept: PHARMACY | Facility: HOSPITAL | Age: 80
End: 2025-09-05
Payer: MEDICARE

## 2025-10-03 ENCOUNTER — APPOINTMENT (OUTPATIENT)
Dept: PHARMACY | Facility: HOSPITAL | Age: 80
End: 2025-10-03
Payer: MEDICARE